# Patient Record
Sex: MALE | Race: BLACK OR AFRICAN AMERICAN | Employment: FULL TIME | URBAN - METROPOLITAN AREA
[De-identification: names, ages, dates, MRNs, and addresses within clinical notes are randomized per-mention and may not be internally consistent; named-entity substitution may affect disease eponyms.]

---

## 2018-02-15 ENCOUNTER — HOSPITAL ENCOUNTER (EMERGENCY)
Facility: HOSPITAL | Age: 58
Discharge: HOME | End: 2018-02-15
Attending: EMERGENCY MEDICINE
Payer: COMMERCIAL

## 2018-02-15 VITALS
TEMPERATURE: 100 F | HEIGHT: 74 IN | RESPIRATION RATE: 18 BRPM | HEART RATE: 99 BPM | DIASTOLIC BLOOD PRESSURE: 74 MMHG | OXYGEN SATURATION: 96 % | BODY MASS INDEX: 34.01 KG/M2 | WEIGHT: 265 LBS | SYSTOLIC BLOOD PRESSURE: 148 MMHG

## 2018-02-15 DIAGNOSIS — J02.0 STREP PHARYNGITIS: Primary | ICD-10-CM

## 2018-02-15 PROCEDURE — 99283 EMERGENCY DEPT VISIT LOW MDM: CPT

## 2018-02-15 RX ORDER — PENICILLIN V POTASSIUM 500 MG/1
500 TABLET ORAL 3 TIMES DAILY
Qty: 30 TABLET | Refills: 0 | Status: SHIPPED | OUTPATIENT
Start: 2018-02-15 | End: 2018-02-25

## 2018-02-16 NOTE — ED NOTES
Pt states has been having fevers, chills, coughing, nausea, and diarrhea.  since Tuesday and sore throat since Monday. Pt went to dialysis today, states didn't remove any fluid today. States fevers have been running approx 100.4.  Has been taking tylenol bu

## 2018-02-16 NOTE — ED INITIAL ASSESSMENT (HPI)
Patient has been sick all week with flu like symptoms. States it is getting worse. Has sore throat, and cannot talk. Just had dialysis 1 hour ago.

## 2018-02-16 NOTE — ED PROVIDER NOTES
Patient Seen in: [facility] AND [facility] Emergency Department    History   Patient presents with:  Cough/URI    Stated Complaint: sick all week. getting worse.   sore throat    HPI    Patient is a 17-year-old male who presents to the emergency department with Cardiovascular: Normal rate, regular rhythm and normal heart sounds. Pulmonary/Chest: Effort normal.   Musculoskeletal: Normal range of motion. Lymphadenopathy:     He has cervical adenopathy.    Neurological: He is alert and oriented to person, plac

## 2018-05-23 ENCOUNTER — HOSPITAL ENCOUNTER (INPATIENT)
Facility: HOSPITAL | Age: 58
LOS: 1 days | Discharge: HOME | DRG: 640 | End: 2018-05-24
Attending: EMERGENCY MEDICINE | Admitting: HOSPITALIST
Payer: COMMERCIAL

## 2018-05-23 DIAGNOSIS — N17.9 ACUTE RENAL FAILURE SUPERIMPOSED ON CHRONIC KIDNEY DISEASE, UNSPECIFIED CKD STAGE, UNSPECIFIED ACUTE RENAL FAILURE TYPE (HCC): Primary | ICD-10-CM

## 2018-05-23 DIAGNOSIS — N18.9 ACUTE RENAL FAILURE SUPERIMPOSED ON CHRONIC KIDNEY DISEASE, UNSPECIFIED CKD STAGE, UNSPECIFIED ACUTE RENAL FAILURE TYPE (HCC): Primary | ICD-10-CM

## 2018-05-23 PROBLEM — E87.5 HYPERKALEMIA: Status: ACTIVE | Noted: 2018-05-23

## 2018-05-23 PROCEDURE — 5A1D70Z PERFORMANCE OF URINARY FILTRATION, INTERMITTENT, LESS THAN 6 HOURS PER DAY: ICD-10-PCS | Performed by: INTERNAL MEDICINE

## 2018-05-23 PROCEDURE — 99223 1ST HOSP IP/OBS HIGH 75: CPT | Performed by: HOSPITALIST

## 2018-05-23 RX ORDER — FUROSEMIDE 20 MG/1
20 TABLET ORAL
Status: DISCONTINUED | OUTPATIENT
Start: 2018-05-23 | End: 2018-05-24

## 2018-05-23 RX ORDER — ERGOCALCIFEROL (VITAMIN D2) 1250 MCG
50000 CAPSULE ORAL WEEKLY
COMMUNITY

## 2018-05-23 RX ORDER — ASPIRIN 81 MG/1
81 TABLET ORAL DAILY
Status: DISCONTINUED | OUTPATIENT
Start: 2018-05-24 | End: 2018-05-24

## 2018-05-23 RX ORDER — ACETAMINOPHEN 325 MG/1
650 TABLET ORAL EVERY 6 HOURS PRN
Status: DISCONTINUED | OUTPATIENT
Start: 2018-05-23 | End: 2018-05-24

## 2018-05-23 RX ORDER — SODIUM CHLORIDE 9 MG/ML
INJECTION, SOLUTION INTRAVENOUS
Status: DISPENSED
Start: 2018-05-23 | End: 2018-05-24

## 2018-05-23 RX ORDER — CARVEDILOL 25 MG/1
25 TABLET ORAL 2 TIMES DAILY WITH MEALS
COMMUNITY

## 2018-05-23 RX ORDER — ALBUMIN (HUMAN) 12.5 G/50ML
100 SOLUTION INTRAVENOUS AS NEEDED
Status: DISCONTINUED | OUTPATIENT
Start: 2018-05-23 | End: 2018-05-24

## 2018-05-23 RX ORDER — ATORVASTATIN CALCIUM 40 MG/1
80 TABLET, FILM COATED ORAL DAILY
Status: DISCONTINUED | OUTPATIENT
Start: 2018-05-24 | End: 2018-05-24

## 2018-05-23 RX ORDER — CINACALCET 60 MG/1
60 TABLET, FILM COATED ORAL EVERY EVENING
COMMUNITY

## 2018-05-23 RX ORDER — CARVEDILOL 25 MG/1
25 TABLET ORAL 2 TIMES DAILY WITH MEALS
Status: DISCONTINUED | OUTPATIENT
Start: 2018-05-23 | End: 2018-05-24

## 2018-05-23 RX ORDER — SODIUM POLYSTYRENE SULFONATE 15 G/60ML
30 SUSPENSION ORAL; RECTAL ONCE
Status: COMPLETED | OUTPATIENT
Start: 2018-05-23 | End: 2018-05-23

## 2018-05-23 RX ORDER — MULTIVIT-MIN/FOLIC ACID/LUTEIN 400-250MCG
1 TABLET,CHEWABLE ORAL DAILY
COMMUNITY

## 2018-05-23 RX ORDER — FUROSEMIDE 20 MG/1
20 TABLET ORAL 2 TIMES DAILY
COMMUNITY

## 2018-05-23 RX ORDER — ALLOPURINOL 100 MG/1
100 TABLET ORAL DAILY
COMMUNITY

## 2018-05-23 RX ORDER — ALLOPURINOL 100 MG/1
100 TABLET ORAL DAILY
Status: DISCONTINUED | OUTPATIENT
Start: 2018-05-24 | End: 2018-05-24

## 2018-05-23 RX ORDER — LISINOPRIL 5 MG/1
5 TABLET ORAL DAILY
Status: ON HOLD | COMMUNITY
End: 2020-03-12

## 2018-05-23 RX ORDER — DEXTROSE MONOHYDRATE 25 G/50ML
50 INJECTION, SOLUTION INTRAVENOUS AS NEEDED
Status: DISCONTINUED | OUTPATIENT
Start: 2018-05-23 | End: 2018-05-24

## 2018-05-23 RX ORDER — ATORVASTATIN CALCIUM 80 MG/1
80 TABLET, FILM COATED ORAL DAILY
Status: ON HOLD | COMMUNITY
End: 2020-03-12

## 2018-05-23 RX ORDER — OMEGA-3-ACID ETHYL ESTERS 1 G/1
1 CAPSULE, LIQUID FILLED ORAL DAILY
COMMUNITY

## 2018-05-23 RX ORDER — CINACALCET 30 MG/1
60 TABLET, FILM COATED ORAL
Status: DISCONTINUED | OUTPATIENT
Start: 2018-05-24 | End: 2018-05-24

## 2018-05-23 RX ORDER — DEXTROSE MONOHYDRATE 25 G/50ML
50 INJECTION, SOLUTION INTRAVENOUS ONCE
Status: COMPLETED | OUTPATIENT
Start: 2018-05-23 | End: 2018-05-23

## 2018-05-23 RX ORDER — SODIUM CHLORIDE 0.9 % (FLUSH) 0.9 %
3 SYRINGE (ML) INJECTION AS NEEDED
Status: DISCONTINUED | OUTPATIENT
Start: 2018-05-23 | End: 2018-05-24

## 2018-05-23 RX ORDER — HEPARIN SODIUM 5000 [USP'U]/ML
5000 INJECTION, SOLUTION INTRAVENOUS; SUBCUTANEOUS EVERY 12 HOURS SCHEDULED
Status: DISCONTINUED | OUTPATIENT
Start: 2018-05-23 | End: 2018-05-23

## 2018-05-23 RX ORDER — ASPIRIN 81 MG/1
81 TABLET ORAL DAILY
COMMUNITY

## 2018-05-23 RX ORDER — ONDANSETRON 2 MG/ML
4 INJECTION INTRAMUSCULAR; INTRAVENOUS EVERY 6 HOURS PRN
Status: DISCONTINUED | OUTPATIENT
Start: 2018-05-23 | End: 2018-05-24

## 2018-05-23 NOTE — ED PROVIDER NOTES
Patient Seen in: [facility] AND [facility] Emergency Department    History   Patient presents with:  Fall (musculoskeletal, neurologic)  Weakness    Stated Complaint: \"WEAK LEGS\"    HPI    Patient is a 57-year-old man with a history of diabetes and renal failu Cardiovascular: Normal rate, regular rhythm, normal heart sounds and intact distal pulses. Pulmonary/Chest: Effort normal and breath sounds normal. No respiratory distress. Abdominal: Soft.  Bowel sounds are normal. Exhibits no distension and no mass DIFFERENTIAL[[id number]]          Abnormal            Final result                 Please view results for these tests on the individual orders.    RAINBOW DRAW BLUE   RAINBOW DRAW LAVENDER   RAINBOW DRAW DARK GREEN   RAINBOW DRAW LIGHT GREEN   RAINBOW DRAW

## 2018-05-23 NOTE — H&P
[facility]    PATIENT'S NAME: Keerthi Silvestre   ATTENDING PHYSICIAN: Elina Poe MD   PATIENT ACCOUNT#:   [id number]    LOCATION:  [place] [address]  MEDICAL RECORD #:   [id number]       YOB: 1960  ADMISSION DATE:       05/2 air.  HEENT:  Atraumatic. Oropharynx clear. Moist mucous membranes. Normal hard and soft palate. NECK:  Trachea midline. Full range of motion. Supple. No thyromegaly or lymphadenopathy. LUNGS:  Clear to auscultation bilaterally.   Normal respirator

## 2018-05-23 NOTE — ED INITIAL ASSESSMENT (HPI)
PATIENT C/O FALL OUT OF FORK LIFT AFTER \"BOTH LEGS GAVE OUT. \" DENIES LOC, DENIES INJURY. PATIENT IS ON DIALYSIS, MISSED YESTERDAY'S APPT.  LAST DIALYSIS WAS 5/19

## 2018-05-23 NOTE — ED NOTES
Pt reports having weakness to legs today and was found to have hyperkalemia. He receives dialysis 3 times a week, but his last treatment was on May 19th. Denies any complaints at present. VS stable. Monitor shows SR without ectopy.

## 2018-05-24 VITALS
OXYGEN SATURATION: 97 % | WEIGHT: 274.38 LBS | BODY MASS INDEX: 35.21 KG/M2 | TEMPERATURE: 98 F | DIASTOLIC BLOOD PRESSURE: 73 MMHG | HEART RATE: 81 BPM | RESPIRATION RATE: 20 BRPM | HEIGHT: 74 IN | SYSTOLIC BLOOD PRESSURE: 124 MMHG

## 2018-05-24 PROCEDURE — 99239 HOSP IP/OBS DSCHRG MGMT >30: CPT | Performed by: HOSPITALIST

## 2018-05-24 NOTE — PLAN OF CARE
Problem: Patient/Family Goals  Goal: Patient/Family Long Term Goal  Patient's Long Term Goal: to return home.     Interventions:  - See additional Care Plan goals for specific interventions   Outcome: Adequate for Discharge    Goal: Patient/Family Short Ter

## 2018-05-24 NOTE — PROGRESS NOTES
Patient was seen and examined. No complaints at present. Has been ambulating in the halls with no issues. Dr. Elissa Jang has cleared patient for d/c home today.  Patient to follow up at the HD clinic tomorrow and then to resume his usual TTS schedule per Dr. Bran Argueta

## 2018-05-24 NOTE — DISCHARGE SUMMARY
[facility] HOSPITALIST  DISCHARGE SUMMARY     Consuello Schlatter Patient Status:  Inpatient    1960 MRN [id number]   Location [address] [facility] Attending No att. providers found   [facility] Day # 1 PCP [address]: 2018  STEPHANIE Tabs  Commonly known as:  SENSIPAR      Take 60 mg by mouth daily with breakfast.   Refills:  0     ergocalciferol 12210 units Caps  Commonly known as:  ERGOCALCIFEROL      Take 50,000 Units by mouth once a week.    Refills:  0     Ferric Citrate 1  M

## 2018-05-24 NOTE — CONSULTS
[facility]    Report of Consultation    Trino Eng Patient Status:  Inpatient    1960 MRN [id number]   Location [facility] Attending Cheli Barreto MD   Hosp Day # 1 PCP Rubin Kelley     Date of Admission:  20 solution 100 mL 100 mL Intravenous PRN   allopurinol (ZYLOPRIM) tab 100 mg 100 mg Oral Daily   apixaban (ELIQUIS) tab 5 mg 5 mg Oral BID   aspirin EC tab 81 mg 81 mg Oral Daily   atorvastatin (LIPITOR) tab 80 mg 80 mg Oral Daily   carvedilol (COREG) tab 25 rhythm, no edema  Abd: Abdomen soft, nontender, nondistended, no organomegaly, bowel sounds present          Results:     Laboratory Data:  Recent Labs   Lab  05/23/18   1446  05/24/18   0510   RBC  3.67*  3.82*   HGB  11.9*  12.4*   HCT  35.1*  35.4*   MC

## 2018-09-10 NOTE — PAYOR COMM NOTE
--------------  DISCHARGE REVIEW    Payor: [employer] [facility]  Subscriber #:  [id number]  Authorization Number: N/A    Admit date: 5/23/18  Admit time:  1803  Discharge Date: 5/24/2018 11:10 AM     Admitting Physician: Michael Mcguire MD  Attending Chemistry showed potassium of 7.6, bicarb of 18, , creatinine 11.23. CBC shows hemoglobin of 11.9. The patient will be admitted to the hospital for further management. He received IV dextrose 50% with insulin and sodium bicarb injection.   Also, h 1 g Caps  Commonly known as:  LOVAZA      Take 1 g by mouth daily.    Refills:  0            CONSULTANTS  Consultants     Provider Role Specialty    Melanie Mcneill DO Consulting Physician  NEPHROLOGY    Diamond Devlin MD Consulting Physician  Tri 171 swelling or fluid retention. Past Medical History:   Diagnosis Date   • Diabetes [facility])    • Essential hypertension    • High cholesterol    • Renal failure        History reviewed. No pertinent surgical history.         Smoking status: Light Tobacco Smok is warm and dry. Psychiatric: Normal mood and affect. Behavior is normal. Judgment and thought content normal.   Nursing note and vitals reviewed.           ED Course     Labs Reviewed   BASIC METABOLIC PANEL (8) - Abnormal; Notable for the following: performing a physical exam, bedside monitoring of interventions, collecting and interpreting tests and discussion with consultants but not including time spent performing procedures.     [facility]     Admission disposition: 5/23/2018  4:24 PM           Case discu emergency department for fatigue. Chemistry showed potassium of 7.6, bicarb of 18, , creatinine 11.23. CBC shows hemoglobin of 11.9. The patient will be admitted to the hospital for further management.   He received IV dextrose 50% with insulin an thrill. NEUROLOGIC:  Motor and sensory intact. Cranial nerves II-XII are intact. EKG showed sinus rhythm with nonspecific ST-T changes. No peaked T waves. ASSESSMENT:    1.   End-stage renal disease, missed dialysis, with hyperkalemia.   2.   Diabe

## 2019-04-14 ENCOUNTER — HOSPITAL ENCOUNTER (EMERGENCY)
Facility: HOSPITAL | Age: 59
Discharge: HOME | End: 2019-04-14
Attending: EMERGENCY MEDICINE
Payer: COMMERCIAL

## 2019-04-14 ENCOUNTER — APPOINTMENT (OUTPATIENT)
Dept: CT IMAGING | Facility: HOSPITAL | Age: 59
End: 2019-04-14
Attending: EMERGENCY MEDICINE
Payer: COMMERCIAL

## 2019-04-14 VITALS
RESPIRATION RATE: 16 BRPM | HEART RATE: 54 BPM | DIASTOLIC BLOOD PRESSURE: 70 MMHG | BODY MASS INDEX: 36.58 KG/M2 | TEMPERATURE: 98 F | WEIGHT: 276 LBS | OXYGEN SATURATION: 94 % | SYSTOLIC BLOOD PRESSURE: 128 MMHG | HEIGHT: 73 IN

## 2019-04-14 DIAGNOSIS — M54.6 BACK PAIN OF THORACOLUMBAR REGION: Primary | ICD-10-CM

## 2019-04-14 DIAGNOSIS — N18.6 ESRD ON HEMODIALYSIS (HCC): ICD-10-CM

## 2019-04-14 DIAGNOSIS — Z99.2 ESRD ON HEMODIALYSIS (HCC): ICD-10-CM

## 2019-04-14 DIAGNOSIS — M54.50 BACK PAIN OF THORACOLUMBAR REGION: Primary | ICD-10-CM

## 2019-04-14 PROCEDURE — 80048 BASIC METABOLIC PNL TOTAL CA: CPT | Performed by: EMERGENCY MEDICINE

## 2019-04-14 PROCEDURE — 96375 TX/PRO/DX INJ NEW DRUG ADDON: CPT

## 2019-04-14 PROCEDURE — 96374 THER/PROPH/DIAG INJ IV PUSH: CPT

## 2019-04-14 PROCEDURE — 85025 COMPLETE CBC W/AUTO DIFF WBC: CPT | Performed by: EMERGENCY MEDICINE

## 2019-04-14 PROCEDURE — 81001 URINALYSIS AUTO W/SCOPE: CPT | Performed by: EMERGENCY MEDICINE

## 2019-04-14 PROCEDURE — 71260 CT THORAX DX C+: CPT | Performed by: EMERGENCY MEDICINE

## 2019-04-14 PROCEDURE — 74176 CT ABD & PELVIS W/O CONTRAST: CPT | Performed by: EMERGENCY MEDICINE

## 2019-04-14 PROCEDURE — 99285 EMERGENCY DEPT VISIT HI MDM: CPT

## 2019-04-14 RX ORDER — MORPHINE SULFATE 4 MG/ML
4 INJECTION, SOLUTION INTRAMUSCULAR; INTRAVENOUS ONCE
Status: COMPLETED | OUTPATIENT
Start: 2019-04-14 | End: 2019-04-14

## 2019-04-14 RX ORDER — ORPHENADRINE CITRATE 30 MG/ML
60 INJECTION INTRAMUSCULAR; INTRAVENOUS ONCE
Status: COMPLETED | OUTPATIENT
Start: 2019-04-14 | End: 2019-04-14

## 2019-04-14 RX ORDER — ORPHENADRINE CITRATE 100 MG/1
100 TABLET, EXTENDED RELEASE ORAL 2 TIMES DAILY PRN
Qty: 20 TABLET | Refills: 0 | Status: SHIPPED | OUTPATIENT
Start: 2019-04-14 | End: 2019-04-21

## 2019-04-14 NOTE — ED PROVIDER NOTES
Patient Seen in: [facility] AND [facility] Emergency Department    History   Patient presents with:  Abdomen/Flank Pain (GI/)    Stated Complaint: Lower back pain    HPI    64year old male with a history of diabetes, hypertension, high cholesterol, DVT/PE on Constitutional: He is oriented to person, place, and time. He appears well-developed and well-nourished. He is cooperative. Non-toxic appearance. No distress. HENT:   Head: Normocephalic and atraumatic.    Eyes: Pupils are equal, round, and reactive to l All other components within normal limits   CBC W/ DIFFERENTIAL - Abnormal; Notable for the following components:    RBC 3.30 (*)     HGB 10.5 (*)     HCT 32.4 (*)     RDW-SD 48.6 (*)     PLT 96.0 (*)     All other components within normal limits   CBC WI morphINE sulfate (PF) 4 MG/ML injection 4 mg (4 mg Intravenous Given 4/14/19 1048)   Orphenadrine Citrate (NORFLEX) injection 60 mg (60 mg Intravenous Given 4/14/19 1048)   iopamidol (ISOVUE-M) 76 % injection 100 mL (80 mL Intravenous Given 4/14/19 1239)

## 2019-04-14 NOTE — ED NOTES
Received pt a/ox3, clear speech, nad, no resp distress, ambulatory with steady gait  Here with c/o HPI triage note with R side tenderness     20 G PIV established to R AC. Flushes well, no s/s of infiltration noted. Labs sent for processing.  meds given, se

## 2019-04-14 NOTE — ED INITIAL ASSESSMENT (HPI)
Pt states \"Right flank pain for 5 days, hx of HD, days T/TH/Saturday, last HD was yesterday\" Denies N/V/fever.

## 2019-11-20 ENCOUNTER — HOSPITAL ENCOUNTER (EMERGENCY)
Facility: HOSPITAL | Age: 59
Discharge: HOME | End: 2019-11-20
Attending: EMERGENCY MEDICINE
Payer: MEDICARE

## 2019-11-20 ENCOUNTER — APPOINTMENT (OUTPATIENT)
Dept: GENERAL RADIOLOGY | Facility: HOSPITAL | Age: 59
End: 2019-11-20
Attending: EMERGENCY MEDICINE
Payer: MEDICARE

## 2019-11-20 VITALS
HEART RATE: 81 BPM | RESPIRATION RATE: 18 BRPM | TEMPERATURE: 99 F | WEIGHT: 278 LBS | SYSTOLIC BLOOD PRESSURE: 133 MMHG | DIASTOLIC BLOOD PRESSURE: 78 MMHG | OXYGEN SATURATION: 97 % | HEIGHT: 74 IN | BODY MASS INDEX: 35.68 KG/M2

## 2019-11-20 DIAGNOSIS — R05.9 COUGH: Primary | ICD-10-CM

## 2019-11-20 PROCEDURE — 99283 EMERGENCY DEPT VISIT LOW MDM: CPT

## 2019-11-20 PROCEDURE — 71045 X-RAY EXAM CHEST 1 VIEW: CPT | Performed by: EMERGENCY MEDICINE

## 2019-11-20 RX ORDER — BENZONATATE 200 MG/1
200 CAPSULE ORAL 3 TIMES DAILY PRN
Qty: 20 CAPSULE | Refills: 0 | Status: SHIPPED | OUTPATIENT
Start: 2019-11-20 | End: 2020-09-09

## 2019-11-20 RX ORDER — IPRATROPIUM BROMIDE AND ALBUTEROL SULFATE 2.5; .5 MG/3ML; MG/3ML
3 SOLUTION RESPIRATORY (INHALATION) ONCE
Status: DISCONTINUED | OUTPATIENT
Start: 2019-11-20 | End: 2019-11-20

## 2019-11-20 RX ORDER — CODEINE PHOSPHATE AND GUAIFENESIN 10; 100 MG/5ML; MG/5ML
5 SOLUTION ORAL EVERY 6 HOURS PRN
Qty: 118 ML | Refills: 0 | Status: SHIPPED | OUTPATIENT
Start: 2019-11-20 | End: 2020-09-09

## 2019-11-20 RX ORDER — ALBUTEROL SULFATE 90 UG/1
2 AEROSOL, METERED RESPIRATORY (INHALATION) EVERY 4 HOURS PRN
Qty: 1 INHALER | Refills: 0 | Status: SHIPPED | OUTPATIENT
Start: 2019-11-20 | End: 2019-12-20

## 2019-11-20 NOTE — ED PROVIDER NOTES
Patient Seen in: [facility] AND [facility] Emergency Department      History   Patient presents with:  Cough    Stated Complaint: Cough    HPI    24-year-old male with history of diabetes, hypertension, hyperlipidemia, ESRD, on dialysis, last dialysis yesterday, vital signs reviewed. All other systems reviewed and negative except as noted above.     Physical Exam     ED Triage Vitals [11/20/19 0103]   /78   Pulse 81   Resp 18   Temp 98.7 °F (37.1 °C)   Temp src Oral   SpO2 97 %   O2 Device None (Room air Chest portable AP view 11/20/19 at 1:16 AM      IMPRESSION:    The lungs appear clear. The cardiac silhouette is at the upper limits of normal in size. Results faxed/finalized on 11/20/19 at 2:35 AM ET.   If you would like to discuss this case dir MEDICAL DECISION MAKING:  After obtaining the patient's history, performing the physical exam and reviewing the diagnostics, multiple initial diagnoses were considered based on the presenting problem including bronchitis, viral syndrome, pneumonia, fluid o

## 2019-11-20 NOTE — ED INITIAL ASSESSMENT (HPI)
Cough X 11 days, pt states cough has gone from productive no nonproductive, rx amoxicillin with no relief of symptoms.

## 2020-03-11 ENCOUNTER — HOSPITAL ENCOUNTER (INPATIENT)
Facility: HOSPITAL | Age: 60
LOS: 1 days | Discharge: HOME | DRG: 682 | End: 2020-03-12
Attending: EMERGENCY MEDICINE | Admitting: HOSPITALIST
Payer: MEDICARE

## 2020-03-11 DIAGNOSIS — N18.6 ESRD ON HEMODIALYSIS (HCC): ICD-10-CM

## 2020-03-11 DIAGNOSIS — Z99.2 ESRD ON HEMODIALYSIS (HCC): ICD-10-CM

## 2020-03-11 DIAGNOSIS — E87.5 HYPERKALEMIA: Primary | ICD-10-CM

## 2020-03-11 LAB
ANION GAP SERPL CALC-SCNC: 11 MMOL/L (ref 0–18)
BASOPHILS # BLD AUTO: 0.03 X10(3) UL (ref 0–0.2)
BASOPHILS NFR BLD AUTO: 0.5 %
BUN BLD-MCNC: 90 MG/DL (ref 7–18)
BUN/CREAT SERPL: 8.3 (ref 10–20)
CALCIUM BLD-MCNC: 8.9 MG/DL (ref 8.5–10.1)
CHLORIDE SERPL-SCNC: 101 MMOL/L (ref 98–112)
CO2 SERPL-SCNC: 26 MMOL/L (ref 21–32)
CREAT BLD-MCNC: 10.9 MG/DL (ref 0.7–1.3)
DEPRECATED RDW RBC AUTO: 44.6 FL (ref 35.1–46.3)
EOSINOPHIL # BLD AUTO: 0.11 X10(3) UL (ref 0–0.7)
EOSINOPHIL NFR BLD AUTO: 1.7 %
ERYTHROCYTE [DISTWIDTH] IN BLOOD BY AUTOMATED COUNT: 12.9 % (ref 11–15)
GLUCOSE BLD-MCNC: 181 MG/DL (ref 70–99)
GLUCOSE BLDC GLUCOMTR-MCNC: 95 MG/DL (ref 70–99)
HBV SURFACE AG SER-ACNC: <0.1 [IU]/L
HBV SURFACE AG SERPL QL IA: NONREACTIVE
HCT VFR BLD AUTO: 28.4 % (ref 39–53)
HGB BLD-MCNC: 9.6 G/DL (ref 13–17.5)
IMM GRANULOCYTES # BLD AUTO: 0.03 X10(3) UL (ref 0–1)
IMM GRANULOCYTES NFR BLD: 0.5 %
LYMPHOCYTES # BLD AUTO: 1.57 X10(3) UL (ref 1–4)
LYMPHOCYTES NFR BLD AUTO: 23.7 %
MCH RBC QN AUTO: 32.1 PG (ref 26–34)
MCHC RBC AUTO-ENTMCNC: 33.8 G/DL (ref 31–37)
MCV RBC AUTO: 95 FL (ref 80–100)
MONOCYTES # BLD AUTO: 0.6 X10(3) UL (ref 0.1–1)
MONOCYTES NFR BLD AUTO: 9.1 %
NEUTROPHILS # BLD AUTO: 4.28 X10 (3) UL (ref 1.5–7.7)
NEUTROPHILS # BLD AUTO: 4.28 X10(3) UL (ref 1.5–7.7)
NEUTROPHILS NFR BLD AUTO: 64.5 %
OSMOLALITY SERPL CALC.SUM OF ELEC: 318 MOSM/KG (ref 275–295)
PLATELET # BLD AUTO: 104 10(3)UL (ref 150–450)
POTASSIUM SERPL-SCNC: 5.8 MMOL/L (ref 3.5–5.1)
RBC # BLD AUTO: 2.99 X10(6)UL (ref 4.3–5.7)
SODIUM SERPL-SCNC: 138 MMOL/L (ref 136–145)
WBC # BLD AUTO: 6.6 X10(3) UL (ref 4–11)

## 2020-03-11 PROCEDURE — 80048 BASIC METABOLIC PNL TOTAL CA: CPT | Performed by: EMERGENCY MEDICINE

## 2020-03-11 PROCEDURE — 99285 EMERGENCY DEPT VISIT HI MDM: CPT

## 2020-03-11 PROCEDURE — 93005 ELECTROCARDIOGRAM TRACING: CPT

## 2020-03-11 PROCEDURE — 85025 COMPLETE CBC W/AUTO DIFF WBC: CPT | Performed by: EMERGENCY MEDICINE

## 2020-03-11 PROCEDURE — 82962 GLUCOSE BLOOD TEST: CPT

## 2020-03-11 PROCEDURE — 93010 ELECTROCARDIOGRAM REPORT: CPT | Performed by: EMERGENCY MEDICINE

## 2020-03-11 PROCEDURE — 87340 HEPATITIS B SURFACE AG IA: CPT | Performed by: INTERNAL MEDICINE

## 2020-03-11 PROCEDURE — 36415 COLL VENOUS BLD VENIPUNCTURE: CPT

## 2020-03-11 RX ORDER — ROSUVASTATIN CALCIUM 10 MG/1
40 TABLET, COATED ORAL NIGHTLY
Status: DISCONTINUED | OUTPATIENT
Start: 2020-03-11 | End: 2020-03-12

## 2020-03-11 RX ORDER — ALLOPURINOL 100 MG/1
100 TABLET ORAL DAILY
Status: DISCONTINUED | OUTPATIENT
Start: 2020-03-12 | End: 2020-03-12

## 2020-03-11 RX ORDER — ALBUMIN (HUMAN) 12.5 G/50ML
100 SOLUTION INTRAVENOUS AS NEEDED
Status: DISCONTINUED | OUTPATIENT
Start: 2020-03-11 | End: 2020-03-12

## 2020-03-11 RX ORDER — LOSARTAN POTASSIUM 25 MG/1
50 TABLET ORAL DAILY
COMMUNITY

## 2020-03-11 RX ORDER — AMLODIPINE BESYLATE 5 MG/1
7.5 TABLET ORAL DAILY
COMMUNITY

## 2020-03-11 RX ORDER — ASPIRIN 81 MG/1
81 TABLET ORAL DAILY
Status: DISCONTINUED | OUTPATIENT
Start: 2020-03-12 | End: 2020-03-12

## 2020-03-11 RX ORDER — ROSUVASTATIN CALCIUM 40 MG/1
40 TABLET, COATED ORAL NIGHTLY
COMMUNITY

## 2020-03-11 RX ORDER — CINACALCET 30 MG/1
60 TABLET, FILM COATED ORAL
Status: DISCONTINUED | OUTPATIENT
Start: 2020-03-12 | End: 2020-03-12

## 2020-03-11 RX ORDER — CARVEDILOL 25 MG/1
25 TABLET ORAL 2 TIMES DAILY WITH MEALS
Status: DISCONTINUED | OUTPATIENT
Start: 2020-03-12 | End: 2020-03-12

## 2020-03-11 NOTE — ED PROVIDER NOTES
Patient Seen in: [facility] AND [facility] Emergency Department      History   Patient presents with:  Abnormal Result    Stated Complaint: hyperkalemia    HPI    65-year-old male presents for complaint of a potassium of 6.7.   Patient states he had blood work do is well-developed. HENT:      Head: Normocephalic and atraumatic. Eyes:      General: Lids are normal.      Extraocular Movements: Extraocular movements intact. Neck:      Musculoskeletal: Normal range of motion and neck supple.    Cardiovascular: CBC W/ DIFFERENTIAL[[id number]]          Abnormal            Final result                 Please view results for these tests on the individual orders.    RAINBOW DRAW BLUE   RAINBOW DRAW LAVENDER   RAINBOW DRAW LIGHT GREEN   RAINBOW DRAW GOLD

## 2020-03-12 VITALS
HEART RATE: 81 BPM | RESPIRATION RATE: 18 BRPM | SYSTOLIC BLOOD PRESSURE: 146 MMHG | DIASTOLIC BLOOD PRESSURE: 74 MMHG | TEMPERATURE: 98 F | OXYGEN SATURATION: 94 % | WEIGHT: 280.31 LBS | HEIGHT: 74 IN | BODY MASS INDEX: 35.97 KG/M2

## 2020-03-12 LAB
ANION GAP SERPL CALC-SCNC: 10 MMOL/L (ref 0–18)
BUN BLD-MCNC: 39 MG/DL (ref 7–18)
BUN/CREAT SERPL: 7.3 (ref 10–20)
CALCIUM BLD-MCNC: 9.8 MG/DL (ref 8.5–10.1)
CHLORIDE SERPL-SCNC: 102 MMOL/L (ref 98–112)
CO2 SERPL-SCNC: 25 MMOL/L (ref 21–32)
CREAT BLD-MCNC: 5.33 MG/DL (ref 0.7–1.3)
GLUCOSE BLD-MCNC: 92 MG/DL (ref 70–99)
GLUCOSE BLDC GLUCOMTR-MCNC: 103 MG/DL (ref 70–99)
OSMOLALITY SERPL CALC.SUM OF ELEC: 293 MOSM/KG (ref 275–295)
POTASSIUM SERPL-SCNC: 3.9 MMOL/L (ref 3.5–5.1)
SODIUM SERPL-SCNC: 137 MMOL/L (ref 136–145)

## 2020-03-12 PROCEDURE — 5A1D70Z PERFORMANCE OF URINARY FILTRATION, INTERMITTENT, LESS THAN 6 HOURS PER DAY: ICD-10-PCS | Performed by: INTERNAL MEDICINE

## 2020-03-12 PROCEDURE — 82962 GLUCOSE BLOOD TEST: CPT

## 2020-03-12 PROCEDURE — 80048 BASIC METABOLIC PNL TOTAL CA: CPT | Performed by: HOSPITALIST

## 2020-03-12 PROCEDURE — 90935 HEMODIALYSIS ONE EVALUATION: CPT

## 2020-03-12 RX ORDER — DEXTROSE MONOHYDRATE 25 G/50ML
50 INJECTION, SOLUTION INTRAVENOUS
Status: DISCONTINUED | OUTPATIENT
Start: 2020-03-12 | End: 2020-03-12

## 2020-03-12 NOTE — H&P
[facility] Hospitalist H&P     CC: Patient presents with:  Abnormal Result       PCP: Nathalia Lopez    Admission Date: 3/11/2020    ASSESSMENT / PLAN:   Mr. Silvina Hills is a 64year old male with PMH of ESRD on home HD 4 days a week , T2DM, HTN, HLD, gout who p failure         PSH  History reviewed. No pertinent surgical history.      ALL:  No Known Allergies     Home Medications:  Rosuvastatin Calcium 40 MG Oral Tab, Take 40 mg by mouth nightly., Disp: , Rfl:   amLODIPine Besylate 5 MG Oral Tab, Take 7.5 mg by mo intact  Psych: Affect- normal  SKIN: warm, dry  EXT: no edema    Diagnostic Data:    CBC/Chem    Recent Labs   Lab 03/11/20  1751   RBC 2.99*   HGB 9.6*   HCT 28.4*   MCV 95.0   MCH 32.1   MCHC 33.8   RDW 12.9   NEPRELIM 4.28   WBC 6.6   .0*

## 2020-03-12 NOTE — ED NOTES
Orders for admission, patient is aware of plan and ready to go upstairs.  Any questions, please call ED RN Nancy Berg  at extension [phone]

## 2020-03-12 NOTE — PLAN OF CARE
Problem: Patient Centered Care  Goal: Patient preferences are identified and integrated in the patient's plan of care  Description  Interventions:  - What would you like us to know as we care for you?  I do dialysis at home  - Provide timely, complete, an Arrange for needed discharge resources and transportation as appropriate  - Identify discharge learning needs (meds, wound care, etc)  - Arrange for interpreters to assist at discharge as needed  - Consider post-discharge preferences of patient/family/disc medications to maintain glucose within target range  - Assess barriers to adequate nutritional intake and initiate nutrition consult as needed  - Instruct patient on self management of diabetes  Outcome: Progressing  Goal: Electrolytes maintained within no improved after dialysis last night. On tele prior to d/c. Pt d/c today with belongings at this time.

## 2020-03-12 NOTE — DISCHARGE SUMMARY
[facility] Hospitalist Discharge Summary   Patient ID:  Arabella Hall [id number]  89 year old 1/8/1960    Admit date: 3/11/2020  Discharge date: 3/12/2020  Risk of Readmission Lace+ Score: 31  59-90 High Risk  29-58 Medium Risk  0-28   Low Risk    Primary Car AC  Take 5 mL by mouth every 6 (six) hours as needed for cough.      Losartan Potassium 25 MG Tabs  Commonly known as:  COZAAR     multiple vitamin Chew     Omega-3-acid Ethyl Esters 1 g Caps  Commonly known as:  LOVAZA     Rosuvastatin Calcium 40 MG Tabs

## 2020-03-12 NOTE — PLAN OF CARE
Problem: Patient Centered Care  Goal: Patient preferences are identified and integrated in the patient's plan of care  Description  Interventions:  - What would you like us to know as we care for you?  \"I do dialysis at home\"  - Provide timely, complete Arrange for needed discharge resources and transportation as appropriate  - Identify discharge learning needs (meds, wound care, etc)  - Arrange for interpreters to assist at discharge as needed  - Consider post-discharge preferences of patient/family/disc medications to maintain glucose within target range  - Assess barriers to adequate nutritional intake and initiate nutrition consult as needed  - Instruct patient on self management of diabetes  Outcome: Progressing  Goal: Electrolytes maintained within no Will continue to monitor closely.

## 2020-03-12 NOTE — CONSULTS
[facility] HOSP - [facility]    Report of Consultation    Trino Eng Patient Status:  Inpatient    1960 MRN [id number]   Location [facility] 5SW/SE Attending Jonelle Lenz MD   Hosp Day # 1 PCP Rubin Kelley     Date of Admission:  3/11 PRN  Insulin Aspart Pen (NOVOLOG) 100 UNIT/ML flexpen 1-5 Units, 1-5 Units, Subcutaneous, TID CC  Albumin Human (ALBUMINAR) 25 % solution 100 mL, 100 mL, Intravenous, PRN  Rosuvastatin Calcium (CRESTOR) tab 40 mg, 40 mg, Oral, Nightly  allopurinol (ZYLOPRI completed. Pertinent positives as above and all the rest were negative.      Physical Exam:   /74 (BP Location: Right arm)   Pulse 81   Temp 98.3 °F (36.8 °C) (Oral)   Resp 18   Ht 6' 2\" (1.88 m)   Wt 280 lb 4.8 oz (127.1 kg)   SpO2 94%   BMI 35.99

## 2020-03-13 NOTE — PAYOR COMM NOTE
--------------  ADMISSION REVIEW     Payor: [employer]  Subscriber #:  [id number]  Authorization Number: [id number]    Admit date: 3/11/20  Admit time: 2044       Admitting Physician: Prateek Bustillos MD  Attending Physician:  No att. providers Positive for stated complaint: hyperkalemia  Other systems are as noted in HPI. Constitutional and vital signs reviewed. All other systems reviewed and negative except as noted above.     Physical Exam     ED Triage Vitals [03/11/20 1715]    Calculated Osmolality 318 (*)     GFR, Non- 5 (*)     GFR, -American 5 (*)     All other components within normal limits   CBC W/ DIFFERENTIAL - Abnormal; Notable for the following components:    RBC 2.99 (*)     HGB 9.6 (*)     HCT We recommend that you schedule follow up care with a primary care provider within the next three months to obtain basic health screening including reassessment of your blood pressure.     Medications Prescribed:  Current Discharge Medication List Patient and/or patient's family given opportunity to ask questions and note understanding and agreeing with therapeutic plan as outlined    Ana Oakley MD  [facility] Hospitalist  Answering Service number: [phone]    HPI       History of Present Illness:   M multiple vitamin Oral Chew Tab, Chew 1 tablet by mouth daily. , Disp: , Rfl:           Soc Hx  Social History    Tobacco Use            Smokeless tobacco: Never Used    Alcohol use: Not Currently       Fam Hx  History reviewed. No pertinent family history. ED Consult to Nephrology [[id number]] ordered by Lewis Parks MD at 03/11/20 8994          Signed        [facility]     Report of Consultation           Pascual Sandoval Patient Status:  Inpatient glucose (DEX4) oral liquid 30 g, 30 g, Oral, Q15 Min PRN    Or  Glucose-Vitamin C (DEX-4) chewable tab 8 tablet, 8 tablet, Oral, Q15 Min PRN  Insulin Aspart Pen (NOVOLOG) 100 UNIT/ML flexpen 1-5 Units, 1-5 Units, Subcutaneous, TID CC  Albumin Human (ALBUMI ergocalciferol 57934 units Oral Cap, Take 50,000 Units by mouth once a week. Takes with dialysis             Allergies  No Known Allergies     Review of Systems:      General: weak           A comprehensive 12 point review of systems was completed.   Pertin Chart Review: Note Routing History     No routing history on file.      PLEASE FAX DAYS CERTIFIED AND NEXT REVIEW DATE

## 2020-03-17 NOTE — PAYOR COMM NOTE
--------------  DISCHARGE REVIEW    Payor: [employer]  Subscriber #:  [id number]  Authorization Number: [id number]    Admit date: 3/11/20  Admit time:  2044  Discharge Date: 3/12/2020  1:20 PM     Admitting Physician: Courtney Loza MD  [facility]

## 2020-03-24 NOTE — PAYOR COMM NOTE
--------------  CONTINUED STAY REVIEW    Payor: [facility]  Subscriber #:  [id number]  Authorization Number: [id number]    Admit date: 3/11/20  Admit time: 2044    - ALL CLINICALS HAVE BEEN FAXED - PLEASE FAX APPROVAL FOR DAYS. THANK YOU.

## 2020-09-09 ENCOUNTER — APPOINTMENT (OUTPATIENT)
Dept: PICC SERVICES | Facility: HOSPITAL | Age: 60
DRG: 291 | End: 2020-09-09
Attending: INTERNAL MEDICINE
Payer: MEDICARE

## 2020-09-09 ENCOUNTER — APPOINTMENT (OUTPATIENT)
Dept: GENERAL RADIOLOGY | Facility: HOSPITAL | Age: 60
DRG: 291 | End: 2020-09-09
Attending: HOSPITALIST
Payer: MEDICARE

## 2020-09-09 ENCOUNTER — APPOINTMENT (OUTPATIENT)
Dept: GENERAL RADIOLOGY | Facility: HOSPITAL | Age: 60
DRG: 291 | End: 2020-09-09
Attending: NURSE PRACTITIONER
Payer: MEDICARE

## 2020-09-09 ENCOUNTER — APPOINTMENT (OUTPATIENT)
Dept: GENERAL RADIOLOGY | Facility: HOSPITAL | Age: 60
DRG: 291 | End: 2020-09-09
Attending: EMERGENCY MEDICINE
Payer: MEDICARE

## 2020-09-09 ENCOUNTER — ANESTHESIA (OUTPATIENT)
Dept: CARDIOLOGY UNIT | Facility: HOSPITAL | Age: 60
DRG: 291 | End: 2020-09-09
Payer: MEDICARE

## 2020-09-09 ENCOUNTER — ANESTHESIA EVENT (OUTPATIENT)
Dept: CARDIOLOGY UNIT | Facility: HOSPITAL | Age: 60
DRG: 291 | End: 2020-09-09
Payer: MEDICARE

## 2020-09-09 ENCOUNTER — HOSPITAL ENCOUNTER (INPATIENT)
Facility: HOSPITAL | Age: 60
LOS: 4 days | Discharge: HOME | DRG: 291 | End: 2020-09-13
Attending: EMERGENCY MEDICINE | Admitting: INTERNAL MEDICINE
Payer: MEDICARE

## 2020-09-09 DIAGNOSIS — R77.8 ELEVATED TROPONIN: ICD-10-CM

## 2020-09-09 DIAGNOSIS — J81.0 ACUTE PULMONARY EDEMA (HCC): Primary | ICD-10-CM

## 2020-09-09 DIAGNOSIS — N18.9 CHRONIC KIDNEY DISEASE, UNSPECIFIED CKD STAGE: ICD-10-CM

## 2020-09-09 LAB
ALBUMIN SERPL-MCNC: 3 G/DL (ref 3.4–5)
ALP LIVER SERPL-CCNC: 54 U/L (ref 45–117)
ALT SERPL-CCNC: 25 U/L (ref 16–61)
ANION GAP SERPL CALC-SCNC: 13 MMOL/L (ref 0–18)
ANION GAP SERPL CALC-SCNC: 6 MMOL/L (ref 0–18)
APTT PPP: 157.7 SECONDS (ref 23.2–35.3)
APTT PPP: 27.7 SECONDS (ref 23.2–35.3)
AST SERPL-CCNC: 29 U/L (ref 15–37)
BASE EXCESS BLD CALC-SCNC: 2.7 MMOL/L (ref ?–2)
BASOPHILS # BLD AUTO: 0.04 X10(3) UL (ref 0–0.2)
BASOPHILS NFR BLD AUTO: 0.5 %
BILIRUB DIRECT SERPL-MCNC: 0.2 MG/DL (ref 0–0.2)
BILIRUB SERPL-MCNC: 0.4 MG/DL (ref 0.1–2)
BUN BLD-MCNC: 45 MG/DL (ref 7–18)
BUN BLD-MCNC: 46 MG/DL (ref 7–18)
BUN/CREAT SERPL: 5.7 (ref 10–20)
BUN/CREAT SERPL: 6.1 (ref 10–20)
CA-I BLD-SCNC: 1.26 MMOL/L (ref 0.95–1.32)
CALCIUM BLD-MCNC: 9.7 MG/DL (ref 8.5–10.1)
CALCIUM BLD-MCNC: 9.7 MG/DL (ref 8.5–10.1)
CHLORIDE SERPL-SCNC: 97 MMOL/L (ref 98–112)
CHLORIDE SERPL-SCNC: 98 MMOL/L (ref 98–112)
CO2 SERPL-SCNC: 25 MMOL/L (ref 21–32)
CO2 SERPL-SCNC: 30 MMOL/L (ref 21–32)
COHGB MFR BLD: 3.8 % (ref 0–1.5)
CREAT BLD-MCNC: 7.52 MG/DL (ref 0.7–1.3)
CREAT BLD-MCNC: 7.83 MG/DL (ref 0.7–1.3)
DEPRECATED RDW RBC AUTO: 46.5 FL (ref 35.1–46.3)
DEPRECATED RDW RBC AUTO: 47.5 FL (ref 35.1–46.3)
EOSINOPHIL # BLD AUTO: 0.1 X10(3) UL (ref 0–0.7)
EOSINOPHIL NFR BLD AUTO: 1.2 %
ERYTHROCYTE [DISTWIDTH] IN BLOOD BY AUTOMATED COUNT: 13.8 % (ref 11–15)
ERYTHROCYTE [DISTWIDTH] IN BLOOD BY AUTOMATED COUNT: 14.1 % (ref 11–15)
EST. AVERAGE GLUCOSE BLD GHB EST-MCNC: 114 MG/DL (ref 68–126)
GLUCOSE BLD-MCNC: 115 MG/DL (ref 70–99)
GLUCOSE BLD-MCNC: 220 MG/DL (ref 70–99)
GLUCOSE BLDC GLUCOMTR-MCNC: 110 MG/DL (ref 70–99)
GLUCOSE BLDC GLUCOMTR-MCNC: 186 MG/DL (ref 70–99)
GLUCOSE BLDC GLUCOMTR-MCNC: 84 MG/DL (ref 70–99)
HBA1C MFR BLD HPLC: 5.6 % (ref ?–5.7)
HBV SURFACE AB SER QL: REACTIVE
HBV SURFACE AB SERPL IA-ACNC: 19.43 MIU/ML
HBV SURFACE AG SER-ACNC: <0.1 [IU]/L
HBV SURFACE AG SERPL QL IA: NONREACTIVE
HCO3 BLDA-SCNC: 26.9 MEQ/L (ref 21–27)
HCT VFR BLD AUTO: 29.3 % (ref 39–53)
HCT VFR BLD AUTO: 32.2 % (ref 39–53)
HGB BLD-MCNC: 10.1 G/DL (ref 13–17.5)
HGB BLD-MCNC: 10.8 G/DL (ref 13–17.5)
HGB BLD-MCNC: 11.4 G/DL (ref 13.5–17.5)
IMM GRANULOCYTES # BLD AUTO: 0.02 X10(3) UL (ref 0–1)
IMM GRANULOCYTES NFR BLD: 0.2 %
LACTIC ACID (BLOOD GAS): 2.1 MMOL/L (ref 0.5–2.2)
LYMPHOCYTES # BLD AUTO: 1.13 X10(3) UL (ref 1–4)
LYMPHOCYTES NFR BLD AUTO: 13.4 %
M PROTEIN MFR SERPL ELPH: 7.8 G/DL (ref 6.4–8.2)
MCH RBC QN AUTO: 31.3 PG (ref 26–34)
MCH RBC QN AUTO: 31.5 PG (ref 26–34)
MCHC RBC AUTO-ENTMCNC: 33.5 G/DL (ref 31–37)
MCHC RBC AUTO-ENTMCNC: 34.5 G/DL (ref 31–37)
MCV RBC AUTO: 91.3 FL (ref 80–100)
MCV RBC AUTO: 93.3 FL (ref 80–100)
METHGB MFR BLD: 0.8 % SAT (ref 0.4–1.5)
MODIFIED ALLEN TEST: POSITIVE
MONOCYTES # BLD AUTO: 0.92 X10(3) UL (ref 0.1–1)
MONOCYTES NFR BLD AUTO: 10.9 %
NEUTROPHILS # BLD AUTO: 6.2 X10 (3) UL (ref 1.5–7.7)
NEUTROPHILS # BLD AUTO: 6.2 X10(3) UL (ref 1.5–7.7)
NEUTROPHILS NFR BLD AUTO: 73.8 %
NT-PROBNP SERPL-MCNC: ABNORMAL PG/ML (ref ?–125)
O2 CT BLD-SCNC: 14.4 VOL% (ref 15–23)
OSMOLALITY SERPL CALC.SUM OF ELEC: 288 MOSM/KG (ref 275–295)
OSMOLALITY SERPL CALC.SUM OF ELEC: 301 MOSM/KG (ref 275–295)
OXYGEN LITERS/MINUTE: 10 L/MIN
PCO2 BLDA: 60 MM HG (ref 35–45)
PH BLDA: 7.31 [PH] (ref 7.35–7.45)
PLATELET # BLD AUTO: 130 10(3)UL (ref 150–450)
PLATELET # BLD AUTO: 161 10(3)UL (ref 150–450)
PO2 BLDA: 71 MM HG (ref 80–100)
POTASSIUM (BLOOD GAS): 4.6 MMOL/L (ref 3.3–5.1)
POTASSIUM SERPL-SCNC: 4.8 MMOL/L (ref 3.5–5.1)
POTASSIUM SERPL-SCNC: 5 MMOL/L (ref 3.5–5.1)
PUNCTURE CHARGE: YES
RBC # BLD AUTO: 3.21 X10(6)UL (ref 4.3–5.7)
RBC # BLD AUTO: 3.45 X10(6)UL (ref 4.3–5.7)
SAO2 % BLDA: 93.6 % (ref 94–100)
SODIUM (BLOOD GAS): 134 MMOL/L (ref 135–145)
SODIUM SERPL-SCNC: 133 MMOL/L (ref 136–145)
SODIUM SERPL-SCNC: 136 MMOL/L (ref 136–145)
TROPONIN I SERPL-MCNC: 0.04 NG/ML (ref ?–0.04)
TROPONIN I SERPL-MCNC: <0.045 NG/ML (ref ?–0.04)
WBC # BLD AUTO: 11.2 X10(3) UL (ref 4–11)
WBC # BLD AUTO: 8.4 X10(3) UL (ref 4–11)

## 2020-09-09 PROCEDURE — 5A1D70Z PERFORMANCE OF URINARY FILTRATION, INTERMITTENT, LESS THAN 6 HOURS PER DAY: ICD-10-PCS | Performed by: INTERNAL MEDICINE

## 2020-09-09 PROCEDURE — 71045 X-RAY EXAM CHEST 1 VIEW: CPT | Performed by: NURSE PRACTITIONER

## 2020-09-09 PROCEDURE — 0BH17EZ INSERTION OF ENDOTRACHEAL AIRWAY INTO TRACHEA, VIA NATURAL OR ARTIFICIAL OPENING: ICD-10-PCS | Performed by: ANESTHESIOLOGY

## 2020-09-09 PROCEDURE — 99291 CRITICAL CARE FIRST HOUR: CPT | Performed by: HOSPITALIST

## 2020-09-09 PROCEDURE — 02HV33Z INSERTION OF INFUSION DEVICE INTO SUPERIOR VENA CAVA, PERCUTANEOUS APPROACH: ICD-10-PCS | Performed by: INTERNAL MEDICINE

## 2020-09-09 PROCEDURE — 71045 X-RAY EXAM CHEST 1 VIEW: CPT | Performed by: EMERGENCY MEDICINE

## 2020-09-09 PROCEDURE — 5A1945Z RESPIRATORY VENTILATION, 24-96 CONSECUTIVE HOURS: ICD-10-PCS | Performed by: INTERNAL MEDICINE

## 2020-09-09 PROCEDURE — 99222 1ST HOSP IP/OBS MODERATE 55: CPT | Performed by: INTERNAL MEDICINE

## 2020-09-09 PROCEDURE — 71045 X-RAY EXAM CHEST 1 VIEW: CPT | Performed by: HOSPITALIST

## 2020-09-09 PROCEDURE — 99291 CRITICAL CARE FIRST HOUR: CPT | Performed by: INTERNAL MEDICINE

## 2020-09-09 RX ORDER — HEPARIN SODIUM AND DEXTROSE 10000; 5 [USP'U]/100ML; G/100ML
18 INJECTION INTRAVENOUS ONCE
Status: COMPLETED | OUTPATIENT
Start: 2020-09-09 | End: 2020-09-09

## 2020-09-09 RX ORDER — FUROSEMIDE 20 MG/1
20 TABLET ORAL
Status: DISCONTINUED | OUTPATIENT
Start: 2020-09-09 | End: 2020-09-13

## 2020-09-09 RX ORDER — HYDRALAZINE HYDROCHLORIDE 20 MG/ML
10 INJECTION INTRAMUSCULAR; INTRAVENOUS ONCE
Status: COMPLETED | OUTPATIENT
Start: 2020-09-09 | End: 2020-09-09

## 2020-09-09 RX ORDER — HEPARIN SODIUM AND DEXTROSE 10000; 5 [USP'U]/100ML; G/100ML
INJECTION INTRAVENOUS CONTINUOUS
Status: DISCONTINUED | OUTPATIENT
Start: 2020-09-09 | End: 2020-09-11

## 2020-09-09 RX ORDER — HEPARIN SODIUM 1000 [USP'U]/ML
80 INJECTION, SOLUTION INTRAVENOUS; SUBCUTANEOUS ONCE
Status: COMPLETED | OUTPATIENT
Start: 2020-09-09 | End: 2020-09-09

## 2020-09-09 RX ORDER — ALBUTEROL SULFATE 90 UG/1
2 AEROSOL, METERED RESPIRATORY (INHALATION)
Status: DISCONTINUED | OUTPATIENT
Start: 2020-09-09 | End: 2020-09-11

## 2020-09-09 RX ORDER — NITROGLYCERIN 20 MG/100ML
INJECTION INTRAVENOUS CONTINUOUS
Status: DISCONTINUED | OUTPATIENT
Start: 2020-09-09 | End: 2020-09-11 | Stop reason: ALTCHOICE

## 2020-09-09 RX ORDER — LOSARTAN POTASSIUM 50 MG/1
50 TABLET ORAL DAILY
Status: DISCONTINUED | OUTPATIENT
Start: 2020-09-09 | End: 2020-09-13

## 2020-09-09 RX ORDER — ROSUVASTATIN CALCIUM 20 MG/1
40 TABLET, COATED ORAL NIGHTLY
Status: DISCONTINUED | OUTPATIENT
Start: 2020-09-09 | End: 2020-09-13

## 2020-09-09 RX ORDER — FUROSEMIDE 10 MG/ML
40 INJECTION INTRAMUSCULAR; INTRAVENOUS ONCE
Status: COMPLETED | OUTPATIENT
Start: 2020-09-09 | End: 2020-09-09

## 2020-09-09 RX ORDER — AMLODIPINE BESYLATE 5 MG/1
7.5 TABLET ORAL DAILY
Status: DISCONTINUED | OUTPATIENT
Start: 2020-09-09 | End: 2020-09-13

## 2020-09-09 RX ORDER — ALLOPURINOL 100 MG/1
100 TABLET ORAL DAILY
Status: DISCONTINUED | OUTPATIENT
Start: 2020-09-09 | End: 2020-09-13

## 2020-09-09 RX ORDER — MORPHINE SULFATE 2 MG/ML
2 INJECTION, SOLUTION INTRAMUSCULAR; INTRAVENOUS EVERY 2 HOUR PRN
Status: DISCONTINUED | OUTPATIENT
Start: 2020-09-09 | End: 2020-09-13

## 2020-09-09 RX ORDER — ALBUMIN (HUMAN) 12.5 G/50ML
100 SOLUTION INTRAVENOUS AS NEEDED
Status: DISCONTINUED | OUTPATIENT
Start: 2020-09-09 | End: 2020-09-10

## 2020-09-09 RX ORDER — HYDRALAZINE HYDROCHLORIDE 20 MG/ML
INJECTION INTRAMUSCULAR; INTRAVENOUS
Status: COMPLETED
Start: 2020-09-09 | End: 2020-09-09

## 2020-09-09 RX ORDER — LORAZEPAM 2 MG/ML
INJECTION INTRAMUSCULAR
Status: COMPLETED
Start: 2020-09-09 | End: 2020-09-09

## 2020-09-09 RX ORDER — NITROGLYCERIN 0.4 MG/1
TABLET SUBLINGUAL
Status: DISPENSED
Start: 2020-09-09 | End: 2020-09-10

## 2020-09-09 RX ORDER — HEPARIN SODIUM 10000 [USP'U]/100ML
INJECTION, SOLUTION INTRAVENOUS
Status: DISPENSED
Start: 2020-09-09 | End: 2020-09-10

## 2020-09-09 RX ORDER — NITROGLYCERIN 20 MG/100ML
INJECTION INTRAVENOUS
Status: COMPLETED
Start: 2020-09-09 | End: 2020-09-09

## 2020-09-09 RX ORDER — HEPARIN SODIUM 1000 [USP'U]/ML
INJECTION, SOLUTION INTRAVENOUS; SUBCUTANEOUS
Status: DISPENSED
Start: 2020-09-09 | End: 2020-09-10

## 2020-09-09 RX ORDER — CINACALCET 30 MG/1
60 TABLET, FILM COATED ORAL EVERY EVENING
Status: DISCONTINUED | OUTPATIENT
Start: 2020-09-09 | End: 2020-09-10

## 2020-09-09 RX ORDER — HYDRALAZINE HYDROCHLORIDE 20 MG/ML
20 INJECTION INTRAMUSCULAR; INTRAVENOUS EVERY 4 HOURS PRN
Status: DISCONTINUED | OUTPATIENT
Start: 2020-09-09 | End: 2020-09-13

## 2020-09-09 RX ORDER — ROCURONIUM BROMIDE 10 MG/ML
INJECTION, SOLUTION INTRAVENOUS AS NEEDED
Status: DISCONTINUED | OUTPATIENT
Start: 2020-09-09 | End: 2020-09-12

## 2020-09-09 RX ORDER — ASPIRIN 81 MG/1
81 TABLET ORAL DAILY
Status: DISCONTINUED | OUTPATIENT
Start: 2020-09-09 | End: 2020-09-13

## 2020-09-09 RX ORDER — CARVEDILOL 25 MG/1
25 TABLET ORAL 2 TIMES DAILY WITH MEALS
Status: DISCONTINUED | OUTPATIENT
Start: 2020-09-09 | End: 2020-09-13

## 2020-09-09 RX ADMIN — ROCURONIUM BROMIDE 40 MG: 10 INJECTION, SOLUTION INTRAVENOUS at 13:23:00

## 2020-09-09 NOTE — CONSULTS
Pulmonary/Critical Care Consultation Note    HPI:   Ren Crisostomo is a 64year old male with Patient presents with:  Dyspnea JOSELUIS SOB  Abdominal Pain    Galen Purvis    Pt is a 65 yo with ESRD, CHF, CAD, HK, HTN, YVES and other med problems who was ad 83745jxkbc/250mL infusion CONTINUOUS, 200-3,000 Units/hr, Intravenous, Continuous  [COMPLETED] hydrALAzine HCl (APRESOLINE) injection 10 mg, 10 mg, Intravenous, Once  allopurinol (ZYLOPRIM) tab 100 mg, 100 mg, Oral, Daily  Cinacalcet HCl (SENSIPAR) tab 60 09/09/2020    CL 97 09/09/2020    CO2 30.0 09/09/2020     09/09/2020    CA 9.7 09/09/2020    ALB 3.0 09/09/2020    ALKPHO 54 09/09/2020    BILT 0.4 09/09/2020    TP 7.8 09/09/2020    AST 29 09/09/2020    ALT 25 09/09/2020    PTT 27.7 09/09/2020    T

## 2020-09-09 NOTE — PROGRESS NOTES
Vascular Access Note  Inserted by Hiwot Lee RN  Vascular Access Screening:   Allergies to Lidocaine: no  Allergies to Latex: no  Presence of Pacemaker/Defibrillator: No  Mastectomy with Lymph Node Dissection: No  AV Fistula / AV Graft: Left Side  Dialysis Cath

## 2020-09-09 NOTE — ED NOTES
Orders for admission, patient is aware of plan and ready to go upstairs.  Any questions, please call ED RN Andrew Montes  at extension [phone]  Type of COVID test sent:  COVID Suspicion level: Low     Titratable drug(s) infusing: NONE, SALINE LOCKED  Rate: NON

## 2020-09-09 NOTE — PROGRESS NOTES
Unable to insert PICC at this point, Hemodialysis still going on, RN notified and will reschedule tomorrow morning. Patient with G18 PIV.

## 2020-09-09 NOTE — RESPIRATORY THERAPY NOTE
Patient intubated in [place] [address] by Dr. Chinyere Landrum. Co2 detected. B/L BS auscultated. 13:45 Patient received intubated from the floor and placed on ventilator on the following settings: AC/VC 12, 600, .90%, +5. O2 saturation 92%.  Size 8.0 ETT secured at 26 at

## 2020-09-09 NOTE — ED INITIAL ASSESSMENT (HPI)
Neno Donahue reports shortness of breath that started in the middle of the night, Worse with exertion.  He does report he has epigastric pains x 3 days    Patient does home dialysis tues, thurs , sat and has been compliant with his lasix    Denies swelling in le

## 2020-09-09 NOTE — PROGRESS NOTES
Responded to RRT. Patient experiencing shortness of breath. Intubated. No additional follow up at this time.        09/09/20 2318   Clinical Encounter Type   Visited With Patient   Routine Visit Introduction   Continue Visiting No

## 2020-09-09 NOTE — CONSULTS
[facility]    Report of Consultation    Armando Gregg Patient Status:  Inpatient    1960 MRN [id number]   Location [facility] 3W/SW Attending [address], [address] [facility] Day # 0 YAMILETH Chaidez     Date of Admissi Citrate 1  MG(Fe) Oral Tab, Take 3 tablets by mouth 3 (three) times daily with meals. ergocalciferol 92235 units Oral Cap, Take 50,000 Units by mouth once a week.  Takes with dialysis   Omega-3-acid Ethyl Esters 1 g Oral Cap, Take 1 g by mouth joya Imaging:  Xr Chest Ap Portable  (cpt=71045)    Result Date: 9/9/2020  CONCLUSION:  1. Mild cardiomegaly and new mild pulmonary congestive changes with interstitial edema.   More preferential bibasilar airspace disease right more than left may be rel

## 2020-09-09 NOTE — ED PROVIDER NOTES
Patient Seen in: [facility] AND [facility] Emergency Department      History   Patient presents with:  Dyspnea JOSELUIS SOB  Abdominal Pain    Stated Complaint: SOB, Abd Pain     HPI    53-year-old male with history of ESRD, on dialysis, last dialyzed yesterday, diab reviewed and negative except as noted above.     Physical Exam     ED Triage Vitals [09/09/20 0723]   BP (!) 166/94   Pulse 96   Resp 22   Temp 97.6 °F (36.4 °C)   Temp src Temporal   SpO2 94 %   O2 Device None (Room air)       Current:/88 (BP Locatio Collection Time: 09/09/20  7:41 AM   Result Value Ref Range    Hold Lavender Auto Resulted    RAINBOW DRAW LIGHT GREEN    Collection Time: 09/09/20  7:41 AM   Result Value Ref Range    Hold Lt Green Auto Resulted    RAINBOW DRAW GOLD    Collection Time: FUNCTION PANEL (7)    Collection Time: 09/09/20  7:41 AM   Result Value Ref Range    AST 29 15 - 37 U/L    ALT 25 16 - 61 U/L    Alkaline Phosphatase 54 45 - 117 U/L    Bilirubin, Total 0.4 0.1 - 2.0 mg/dL    Bilirubin, Direct 0.2 0.0 - 0.2 mg/dL    Total recommended decreasing current daily usage, considering nicotine replacement therapy and recommended the patient follow-up with their primary care physician to discuss medications such as Chantix and others to assist with smoking cessation.       The patien Prescribed:  Current Discharge Medication List                       Present on Admission           ICD-10-CM Noted POA    * (Principal) Acute pulmonary edema ([facility] [place] J81.0 9/9/2020 Unknown

## 2020-09-09 NOTE — PROGRESS NOTES
[facility] Pharmacy Note:  Renal Adjustment for piperacillin/tazobactam (Toma Chandler)    Valeria Pressley is a 64year old patient who has been prescribed piperacillin/tazobactam (ZOSYN) 4.5 mg every 8 hrs.   CrCl is estimated creatinine clearance is 12 mL/min (A) (bas

## 2020-09-09 NOTE — SIGNIFICANT EVENT
Called to room for rapid response. Pt became acutely sob. He was started on NRB and was satting 92%. He was unable to talk he felt so sob. His CXR this am showed b/l effusions. He received lasix iv in the ER and was able to urinate.    He is on dialys

## 2020-09-09 NOTE — ANESTHESIA PROCEDURE NOTES
Airway  Date/Time: 9/9/2020 1:25 PM  Urgency: emergent    Airway not difficult    General Information and Staff    Patient location during procedure: floor  Anesthesiologist: Ruthie Gonzales MD  Performed: anesthesiologist     Consent for Airway (if perf

## 2020-09-09 NOTE — RESPIRATORY THERAPY NOTE
Patient on ventilator settings CPAP 14/5, triggering apnea alarms, placed back on full support A/C 12/600/+5/40%. Respiratory status stable and tolerating ventilator well.

## 2020-09-09 NOTE — CONSULTS
[facility]  Cardiology Consultation    Laine Zafar Location: [facility] 3W/SW    1960 MRN [id number]   Consulting Date 2020 DARYN [id number]   Consulting Physician Arielle Shaw short of breath. He does weigh himself prior to dialysis and yesterday he was 126.4 kg which is below his usual dry weight. He states that his dry weight is 127.5 kg. He believes that only 1 L was removed during dialysis yesterday.   He denies chest pain 55198 units Oral Cap, Take 50,000 Units by mouth once a week. Takes with dialysis   Omega-3-acid Ethyl Esters 1 g Oral Cap, Take 1 g by mouth daily. allopurinol 100 MG Oral Tab, Take 100 mg by mouth daily.   apixaban 2.5 MG Oral Tab, Take 5 mg by mouth 2 ( facial droop, or gross motor deficits. Psychiatric: Coooperative, calm.     Laboratory Data:  ECG: SR 80, ST depression/inv T waves V4-V6      IMPRESSIONS:  1) Acute on chronic systolic CHF  Last EF 18-61%  Pro BNP 43, 268  CXR mild CHF    2) ESRD    3) H/

## 2020-09-09 NOTE — PROGRESS NOTES
[facility]    Brief Note    Sim Kranthi Patient Status:  Inpatient    1960 MRN [id number]   Location [facility] 3W/SW Attending Margareth Amador # 0 PCP Galen Purvis     Date of Note:  2020  Ti

## 2020-09-10 ENCOUNTER — APPOINTMENT (OUTPATIENT)
Dept: CV DIAGNOSTICS | Facility: HOSPITAL | Age: 60
DRG: 291 | End: 2020-09-10
Attending: NURSE PRACTITIONER
Payer: MEDICARE

## 2020-09-10 ENCOUNTER — APPOINTMENT (OUTPATIENT)
Dept: CT IMAGING | Facility: HOSPITAL | Age: 60
DRG: 291 | End: 2020-09-10
Attending: INTERNAL MEDICINE
Payer: MEDICARE

## 2020-09-10 ENCOUNTER — APPOINTMENT (OUTPATIENT)
Dept: GENERAL RADIOLOGY | Facility: HOSPITAL | Age: 60
DRG: 291 | End: 2020-09-10
Attending: INTERNAL MEDICINE
Payer: MEDICARE

## 2020-09-10 LAB
ANION GAP SERPL CALC-SCNC: 10 MMOL/L (ref 0–18)
APTT PPP: 105.2 SECONDS (ref 23.2–35.3)
APTT PPP: 55.1 SECONDS (ref 23.2–35.3)
APTT PPP: 84.7 SECONDS (ref 23.2–35.3)
BASOPHILS # BLD AUTO: 0.04 X10(3) UL (ref 0–0.2)
BASOPHILS NFR BLD AUTO: 0.5 %
BUN BLD-MCNC: 30 MG/DL (ref 7–18)
BUN/CREAT SERPL: 4.9 (ref 10–20)
CALCIUM BLD-MCNC: 8.9 MG/DL (ref 8.5–10.1)
CHLORIDE SERPL-SCNC: 102 MMOL/L (ref 98–112)
CO2 SERPL-SCNC: 24 MMOL/L (ref 21–32)
CREAT BLD-MCNC: 6.17 MG/DL (ref 0.7–1.3)
DEPRECATED RDW RBC AUTO: 47.2 FL (ref 35.1–46.3)
EOSINOPHIL # BLD AUTO: 0.04 X10(3) UL (ref 0–0.7)
EOSINOPHIL NFR BLD AUTO: 0.5 %
ERYTHROCYTE [DISTWIDTH] IN BLOOD BY AUTOMATED COUNT: 13.9 % (ref 11–15)
GLUCOSE BLD-MCNC: 103 MG/DL (ref 70–99)
GLUCOSE BLDC GLUCOMTR-MCNC: 82 MG/DL (ref 70–99)
GLUCOSE BLDC GLUCOMTR-MCNC: 83 MG/DL (ref 70–99)
GLUCOSE BLDC GLUCOMTR-MCNC: 92 MG/DL (ref 70–99)
GLUCOSE BLDC GLUCOMTR-MCNC: 94 MG/DL (ref 70–99)
HCT VFR BLD AUTO: 28.6 % (ref 39–53)
HGB BLD-MCNC: 9.7 G/DL (ref 13–17.5)
IMM GRANULOCYTES # BLD AUTO: 0.02 X10(3) UL (ref 0–1)
IMM GRANULOCYTES NFR BLD: 0.2 %
LYMPHOCYTES # BLD AUTO: 0.95 X10(3) UL (ref 1–4)
LYMPHOCYTES NFR BLD AUTO: 10.8 %
MCH RBC QN AUTO: 31.1 PG (ref 26–34)
MCHC RBC AUTO-ENTMCNC: 33.9 G/DL (ref 31–37)
MCV RBC AUTO: 91.7 FL (ref 80–100)
MONOCYTES # BLD AUTO: 0.93 X10(3) UL (ref 0.1–1)
MONOCYTES NFR BLD AUTO: 10.5 %
NEUTROPHILS # BLD AUTO: 6.84 X10 (3) UL (ref 1.5–7.7)
NEUTROPHILS # BLD AUTO: 6.84 X10(3) UL (ref 1.5–7.7)
NEUTROPHILS NFR BLD AUTO: 77.5 %
OSMOLALITY SERPL CALC.SUM OF ELEC: 288 MOSM/KG (ref 275–295)
PLATELET # BLD AUTO: 115 10(3)UL (ref 150–450)
PLATELET # BLD AUTO: 115 10(3)UL (ref 150–450)
POTASSIUM SERPL-SCNC: 4.8 MMOL/L (ref 3.5–5.1)
RBC # BLD AUTO: 3.12 X10(6)UL (ref 4.3–5.7)
SARS-COV-2 RNA RESP QL NAA+PROBE: NOT DETECTED
SODIUM SERPL-SCNC: 136 MMOL/L (ref 136–145)
WBC # BLD AUTO: 8.8 X10(3) UL (ref 4–11)

## 2020-09-10 PROCEDURE — 99232 SBSQ HOSP IP/OBS MODERATE 35: CPT | Performed by: INTERNAL MEDICINE

## 2020-09-10 PROCEDURE — 99291 CRITICAL CARE FIRST HOUR: CPT | Performed by: INTERNAL MEDICINE

## 2020-09-10 PROCEDURE — 93306 TTE W/DOPPLER COMPLETE: CPT | Performed by: NURSE PRACTITIONER

## 2020-09-10 PROCEDURE — 71260 CT THORAX DX C+: CPT | Performed by: INTERNAL MEDICINE

## 2020-09-10 PROCEDURE — 71045 X-RAY EXAM CHEST 1 VIEW: CPT | Performed by: INTERNAL MEDICINE

## 2020-09-10 RX ORDER — ALBUMIN (HUMAN) 12.5 G/50ML
100 SOLUTION INTRAVENOUS AS NEEDED
Status: DISCONTINUED | OUTPATIENT
Start: 2020-09-10 | End: 2020-09-12 | Stop reason: ALTCHOICE

## 2020-09-10 NOTE — H&P
[facility]    PATIENT'S NAME: Karolyn Buddy   ATTENDING PHYSICIAN: Domenico Bangura MD   PATIENT ACCOUNT#:   [id number]    LOCATION:  [address] [place] [address] #:   [id number]       YOB: 1960  ADMISSION DATE:       09/09 1.   Acute pulmonary edema/acute respiratory failure: On mechanical ventilator. Pulmonary is following. Could be due to congestive heart failure versus pneumonia. Started on IV antibiotics and received emergent dialysis.     2.   Hypertension, con

## 2020-09-10 NOTE — PROGRESS NOTES
[facility]    Progress Note    Vadim Franco Patient Status:  Inpatient    1960 MRN [id number]   Location [facility] 2W/SW Attending [address], [address] Day # 1 PCP Lia Bunn       Subjective:   Josie Murillo Chest Ap Portable  (cpt=71045)    Result Date: 9/10/2020  CONCLUSION:  1. Small bilateral pleural effusions and associated bibasilar opacities, which may reflect atelectasis and/or superimposed pneumonia. 2. Additional support tubes and lines as above.

## 2020-09-10 NOTE — PROGRESS NOTES
Pulmonary/Critical Care Follow Up Note    HPI:   Shawn Sequeira is a 64year old male with Patient presents with:  Dyspnea JOSELUIS SOB  Abdominal Pain      PCP Michi Armendariz  Admission Attending Estefani Moreno 15 Day #1    No events 4.5 g in dextrose 5 % 100 mL MBP/ADD-vantage, 4.5 g, Intravenous, Q12H    Facility-Administered Medications Ordered in Other Encounters:   •  propofol (DIPRIVAN) injection, , Intravenous, PRN  •  Rocuronium Bromide (ZEMURON) injection, , Intravenous, PRN hep gtt  Plan ok to cont hep gtt     DVT px  Hep gtt        32 min CCT     D/w RN      Christina Beavers MD

## 2020-09-10 NOTE — DIETARY NOTE
ADULT NUTRITION INITIAL ASSESSMENT    Pt is at high nutrition risk. Pt does not meet malnutrition criteria. RECOMMENDATIONS TO MD:  See Nutrition Intervention for EN support rx. Enteral order set in place.       NUTRITION DIAGNOSIS/PROBLEM:  Marie Screws Nutrition: Nepro 25 ml/hr x ave 22 hr infusion time and advance 10 ml increments q 4 hrs to goal 35 ml/hr + Prosource begin 1 packets TID. This will provide pt ~ 1506 kcals, 86 gms protein, 700 ml h20, 82% RDI's, FWF: 30 ml q 4 hrs (180 ml).  Total h20/24 h • Continuous dose Heparin infusion 1,900 Units/hr (09/10/20 1031)   • propofol 24.86 mcg/kg/min (09/10/20 1400)       • amLODIPine Besylate  7.5 mg Oral Daily   • aspirin  81 mg Oral Daily   • carvedilol  25 mg Oral BID with meals   • Losartan Potassium

## 2020-09-10 NOTE — PLAN OF CARE
Problem: Patient Centered Care  Goal: Patient preferences are identified and integrated in the patient's plan of care  Description  Interventions:  - What would you like us to know as we care for you?  - Provide timely, complete, and accurate information t for assistance with activity based on assessment  - Modify environment to reduce risk of injury  - Provide assistive devices as appropriate  - Consider OT/PT consult to assist with strengthening/mobility  - Encourage toileting schedule  Outcome: Progressin drowsy/follows commands when sedation was off, FIO2 at 40% with o2 sat at %, OG placed and connected to LIS per MD order. Remains on heparin gtt x PE/DVT prophylaxis. Updated wife regarding pt's status.

## 2020-09-10 NOTE — PLAN OF CARE
Pt remains on restraints to prevent pulling lines and tubes.     Problem: Safety Risk - Non-Violent Restraints  Goal: Patient will remain free from self-harm  Description  INTERVENTIONS:  - Apply the least restrictive restraint to prevent harm  - Notify pat

## 2020-09-10 NOTE — PLAN OF CARE
Off sedation in morning for couple hours. Alert cooperative moving all extremities, nodding head to questions. Blood pressure increased. Many oral and ETT secretions requiring constant suctioning. Coughing constantly. Restraints removed  for short time.  Pa Reorient and redirection as needed  - Assess for the need to continue restraints  Outcome: Progressing     Problem: PAIN - ADULT  Goal: Verbalizes/displays adequate comfort level or patient's stated pain goal  Description  INTERVENTIONS:  - Encourage pt to patient/family/discharge partner in discharge planning  - Arrange for needed discharge resources and transportation as appropriate  - Identify discharge learning needs (meds, wound care, etc)  - Arrange for interpreters to assist at discharge as needed  - interventions  Outcome: Progressing     Problem: CARDIOVASCULAR - ADULT  Goal: Maintains optimal cardiac output and hemodynamic stability  Description  INTERVENTIONS:  - Monitor vital signs, rhythm, and trends  - Monitor for bleeding, hypotension and signs volume status, including labs, urine output, blood pressure (other measures as available)  - Encourage oral intake as appropriate  - Instruct patient on fluid and nutrition restrictions as appropriate  Outcome: Progressing     Problem: SKIN/TISSUE INTEGRIT

## 2020-09-10 NOTE — RESPIRATORY THERAPY NOTE
Received patient on full vent support on the following settings VC/AC 12/600/+5/40%. Patient suctioned as needed saturating appropriately. No weaning/ acute events overnight. RT will continue to monitor.

## 2020-09-10 NOTE — PROGRESS NOTES
[facility]  Progress Note    Shawn Sequeira Patient Status:  Inpatient    1960 MRN [id number]   Location [facility] 2W/SW Attending Margareth Amador # 1 PCP [facility]     Assessment:    1.   Acute respirator Labs:  Lab Results   Component Value Date    WBC 8.8 09/10/2020    HGB 9.7 09/10/2020    HCT 28.6 09/10/2020    .0 09/10/2020    .0 09/10/2020     Lab Results   Component Value Date    PT 11.9 12/02/2015    INR 0.9 12/02/2015     Lab Resu

## 2020-09-11 ENCOUNTER — APPOINTMENT (OUTPATIENT)
Dept: GENERAL RADIOLOGY | Facility: HOSPITAL | Age: 60
DRG: 291 | End: 2020-09-11
Attending: INTERNAL MEDICINE
Payer: MEDICARE

## 2020-09-11 LAB
ALBUMIN SERPL-MCNC: 2.8 G/DL (ref 3.4–5)
ANION GAP SERPL CALC-SCNC: 9 MMOL/L (ref 0–18)
APTT PPP: 121.4 SECONDS (ref 23.2–35.3)
APTT PPP: 47.9 SECONDS (ref 23.2–35.3)
BASOPHILS # BLD AUTO: 0.03 X10(3) UL (ref 0–0.2)
BASOPHILS NFR BLD AUTO: 0.4 %
BUN BLD-MCNC: 27 MG/DL (ref 7–18)
BUN/CREAT SERPL: 4.5 (ref 10–20)
CALCIUM BLD-MCNC: 9.2 MG/DL (ref 8.5–10.1)
CHLORIDE SERPL-SCNC: 97 MMOL/L (ref 98–112)
CO2 SERPL-SCNC: 29 MMOL/L (ref 21–32)
CREAT BLD-MCNC: 5.94 MG/DL (ref 0.7–1.3)
DEPRECATED RDW RBC AUTO: 47.5 FL (ref 35.1–46.3)
EOSINOPHIL # BLD AUTO: 0.07 X10(3) UL (ref 0–0.7)
EOSINOPHIL NFR BLD AUTO: 1 %
ERYTHROCYTE [DISTWIDTH] IN BLOOD BY AUTOMATED COUNT: 14.1 % (ref 11–15)
GLUCOSE BLD-MCNC: 122 MG/DL (ref 70–99)
GLUCOSE BLDC GLUCOMTR-MCNC: 120 MG/DL (ref 70–99)
GLUCOSE BLDC GLUCOMTR-MCNC: 127 MG/DL (ref 70–99)
GLUCOSE BLDC GLUCOMTR-MCNC: 138 MG/DL (ref 70–99)
GLUCOSE BLDC GLUCOMTR-MCNC: 164 MG/DL (ref 70–99)
GLUCOSE BLDC GLUCOMTR-MCNC: 182 MG/DL (ref 70–99)
HAV IGM SER QL: 2.5 MG/DL (ref 1.6–2.6)
HCT VFR BLD AUTO: 33.4 % (ref 39–53)
HGB BLD-MCNC: 11.3 G/DL (ref 13–17.5)
IMM GRANULOCYTES # BLD AUTO: 0.03 X10(3) UL (ref 0–1)
IMM GRANULOCYTES NFR BLD: 0.4 %
LYMPHOCYTES # BLD AUTO: 0.98 X10(3) UL (ref 1–4)
LYMPHOCYTES NFR BLD AUTO: 14.2 %
MCH RBC QN AUTO: 31 PG (ref 26–34)
MCHC RBC AUTO-ENTMCNC: 33.8 G/DL (ref 31–37)
MCV RBC AUTO: 91.8 FL (ref 80–100)
MONOCYTES # BLD AUTO: 0.83 X10(3) UL (ref 0.1–1)
MONOCYTES NFR BLD AUTO: 12 %
NEUTROPHILS # BLD AUTO: 4.97 X10 (3) UL (ref 1.5–7.7)
NEUTROPHILS # BLD AUTO: 4.97 X10(3) UL (ref 1.5–7.7)
NEUTROPHILS NFR BLD AUTO: 72 %
OSMOLALITY SERPL CALC.SUM OF ELEC: 286 MOSM/KG (ref 275–295)
PHOSPHATE SERPL-MCNC: 5.7 MG/DL (ref 2.5–4.9)
PLATELET # BLD AUTO: 108 10(3)UL (ref 150–450)
PLATELET # BLD AUTO: 108 10(3)UL (ref 150–450)
POTASSIUM SERPL-SCNC: 3.8 MMOL/L (ref 3.5–5.1)
RBC # BLD AUTO: 3.64 X10(6)UL (ref 4.3–5.7)
SARS-COV-2 RNA RESP QL NAA+PROBE: NOT DETECTED
SODIUM SERPL-SCNC: 135 MMOL/L (ref 136–145)
WBC # BLD AUTO: 6.9 X10(3) UL (ref 4–11)

## 2020-09-11 PROCEDURE — 99232 SBSQ HOSP IP/OBS MODERATE 35: CPT | Performed by: INTERNAL MEDICINE

## 2020-09-11 PROCEDURE — 71045 X-RAY EXAM CHEST 1 VIEW: CPT | Performed by: INTERNAL MEDICINE

## 2020-09-11 PROCEDURE — 99291 CRITICAL CARE FIRST HOUR: CPT | Performed by: INTERNAL MEDICINE

## 2020-09-11 RX ORDER — ALBUTEROL SULFATE 2.5 MG/3ML
2.5 SOLUTION RESPIRATORY (INHALATION)
Status: DISCONTINUED | OUTPATIENT
Start: 2020-09-11 | End: 2020-09-12

## 2020-09-11 RX ORDER — HEPARIN SODIUM AND DEXTROSE 10000; 5 [USP'U]/100ML; G/100ML
1000 INJECTION INTRAVENOUS ONCE
Status: COMPLETED | OUTPATIENT
Start: 2020-09-11 | End: 2020-09-11

## 2020-09-11 RX ORDER — HYDRALAZINE HYDROCHLORIDE 10 MG/1
10 TABLET, FILM COATED ORAL EVERY 8 HOURS SCHEDULED
Status: DISCONTINUED | OUTPATIENT
Start: 2020-09-11 | End: 2020-09-12

## 2020-09-11 RX ORDER — ISOSORBIDE DINITRATE 20 MG/1
20 TABLET ORAL
Status: DISCONTINUED | OUTPATIENT
Start: 2020-09-11 | End: 2020-09-13

## 2020-09-11 RX ORDER — ALBUMIN (HUMAN) 12.5 G/50ML
100 SOLUTION INTRAVENOUS AS NEEDED
Status: DISCONTINUED | OUTPATIENT
Start: 2020-09-12 | End: 2020-09-13

## 2020-09-11 RX ORDER — HEPARIN SODIUM 5000 [USP'U]/ML
5000 INJECTION, SOLUTION INTRAVENOUS; SUBCUTANEOUS EVERY 8 HOURS SCHEDULED
Status: DISCONTINUED | OUTPATIENT
Start: 2020-09-11 | End: 2020-09-11

## 2020-09-11 RX ORDER — HEPARIN SODIUM 1000 [USP'U]/ML
5000 INJECTION, SOLUTION INTRAVENOUS; SUBCUTANEOUS ONCE
Status: COMPLETED | OUTPATIENT
Start: 2020-09-11 | End: 2020-09-11

## 2020-09-11 RX ORDER — HEPARIN SODIUM AND DEXTROSE 10000; 5 [USP'U]/100ML; G/100ML
INJECTION INTRAVENOUS CONTINUOUS
Status: DISCONTINUED | OUTPATIENT
Start: 2020-09-11 | End: 2020-09-12

## 2020-09-11 RX ORDER — ALBUTEROL SULFATE 90 UG/1
4 AEROSOL, METERED RESPIRATORY (INHALATION)
Status: DISCONTINUED | OUTPATIENT
Start: 2020-09-11 | End: 2020-09-11

## 2020-09-11 NOTE — RESPIRATORY THERAPY NOTE
I received pt vented on AC 12/600/30% +5, 8.0/26 cm at the lips, suctioned as needed, diminished bs bilateral, pt is alert with good gag reflex. Goal is to attempt SBT and possibly extubate. RT will continue to monitor pt.

## 2020-09-11 NOTE — PROGRESS NOTES
Patient has a history of recurrent pulmonary embolus. No current pulmonary embolus. Will decrease heparin gtt to low dose. Resume Eliquis when taking PO. Should be on 5mg bid.

## 2020-09-11 NOTE — PROGRESS NOTES
[place] [facility]    Progress Note    Pascual Sandoval Patient Status:  Inpatient    1960 MRN [id number]   Location [facility] 2W/SW Attending [address], [address] [facility] Day # 2 PCP Sabine Whelan       Subjective:   Geovanna Cuevas 09/09/2020    .4 (H) 09/11/2020    INR 0.9 12/02/2015    PT 11.9 12/02/2015    MG 2.5 09/11/2020    PHOS 5.7 (H) 09/11/2020    TROP <0.045 09/09/2020       Xr Chest Ap Portable  (cpt=71045)    Result Date: 9/11/2020  CONCLUSION:  1.  No significant c right pleural space. 4. Bibasilar probable atelectasis, with or without superimposed pneumonia. 5. Inhomogeneous ground-glass opacities are present bilaterally and may reflect pulmonary interstitial edema.   6. Bronchial wall thickening and mucus/debris m

## 2020-09-11 NOTE — PROGRESS NOTES
Patient extubated at 1130 per MD orders with RT at bedside. No complications. No distress. Patient A&Ox4, following commands.

## 2020-09-11 NOTE — PLAN OF CARE
Patient extubated at 1130am. O2 weaned to 2L NC, no distress noted. Lungs diminished- CDB encouraged. Patient up ambulating to bathroom, sitting up in chair. Tolerating well. Passed swallow eval. No complaints of pain. Safety maintained.  Remains on heparin growth factors as ordered  - Implement neutropenic guidelines  Outcome: Progressing     Problem: SAFETY ADULT - FALL  Goal: Free from fall injury  Description  INTERVENTIONS:  - Assess pt frequently for physical needs  - Identify cognitive and physical def Provide Smoking Cessation handout, if applicable  - Encourage broncho-pulmonary hygiene including cough, deep breathe, Incentive Spirometry  - Assess the need for suctioning and perform as needed  - Assess and instruct to report SOB or any respiratory diff needed  - Instruct patient on self management of diabetes  Outcome: Progressing  Goal: Electrolytes maintained within normal limits  Description  INTERVENTIONS:  - Monitor labs and rhythm and assess patient for signs and symptoms of electrolyte imbalances

## 2020-09-11 NOTE — PROGRESS NOTES
Pulmonary/Critical Care Follow Up Note    HPI:   Shawn Sequeira is a 64year old male with Patient presents with:  Dyspnea JOSELUIS SOB  Abdominal Pain      PCP Michi Armendariz  Admission Attending Estefani Moreno 15 Day #2    No events 5-100 mcg/kg/min, Intravenous, Continuous  •  morphINE sulfate (PF) 2 MG/ML injection 2 mg, 2 mg, Intravenous, Q2H PRN  •  hydrALAzine HCl (APRESOLINE) injection 20 mg, 20 mg, Intravenous, Q4H PRN  •  Piperacillin Sod-Tazobactam So (ZOSYN) 4.5 g in dextros follow for now     GERD  Plan PPI     H/o PE in past  Current presentation not c/w PE  On apixban at home  Started on hep gtt now off  Plan re evaluate if pt still needs full dose A/C     DVT px  Hep SQ        31 min CCT     D/w RN and RT      Zoey Yung

## 2020-09-11 NOTE — SLP NOTE
ADULT SWALLOWING EVALUATION    ASSESSMENT    ASSESSMENT/OVERALL IMPRESSION:  PPE REQUIRED. THIS SLP WORE GLOVES AND DROPLET MASK. HANDS SANITIZED/WASHED UPON ENTRANCE/EXIT. SLP BSSE orders received and acknowledged.  A swallow evaluation warranted as Precautions: Upright position; Slow rate;Small bites and sips; No straw  Medication Administration Recommendations: One pill at a time  Treatment Plan/Recommendations: Aspiration precautions  Discharge Recommendations/Plan: Undetermined    HISTORY   MEDICAL Upright;Midline    Oral Phase of Swallow: Within Functional Limits    Pharyngeal Phase of Swallow: Within Functional Limits  (Please note: Silent aspiration cannot be evaluated clinically.  Videofluoroscopic Swallow Study is required to rule-out silent aspi

## 2020-09-11 NOTE — PROGRESS NOTES
[facility]    Progress Note    Trino Eng Patient Status:  Inpatient    1960 MRN [id number]   Location [facility] 2W/SW Attending [address], [address] Day # 2 PCP Julayaz Space       Subjective:   Sarah Crystal 5-100 mcg/kg/min, Intravenous, Continuous  morphINE sulfate (PF) 2 MG/ML injection 2 mg, 2 mg, Intravenous, Q2H PRN  Albuterol Sulfate  (90 Base) MCG/ACT inhaler 2 puff, 2 puff, Inhalation, 4 times per day  hydrALAzine HCl (APRESOLINE) injection 20 tube placement with tip in the proximal-mid gastric body.     Dictated by (CST): Armando Cruz MD on 9/09/2020 at 9:45 PM     Finalized by (CST): Armando Cruz MD on 9/09/2020 at 9:46 PM          Xr Chest Ap Portable  (cpt=71045)    Result Date: 9/9/2020  CON 09/09/2020 at 17:55 by Luci Dominguez MD    Ekg 12-lead    Result Date: 9/9/2020  ECG Report  Interpretation  -------------------------- Sinus Rhythm -Left atrial enlargement.  -Nonspecific ST depression + Negative T-waves.  ABNORMAL When compared with ECG of

## 2020-09-11 NOTE — PROGRESS NOTES
[facility]  Progress Note    Anup Bautista Patient Status:  Inpatient    1960 MRN [id number]   Location [facility] 2W/SW Attending Margareth Amador # 2 PCP [facility]     Assessment:    1.   Acute respirator Component Value Date    WBC 6.9 09/11/2020    HGB 11.3 09/11/2020    HCT 33.4 09/11/2020    .0 09/11/2020    .0 09/11/2020     Lab Results   Component Value Date    PT 11.9 12/02/2015    INR 0.9 12/02/2015     Lab Results   Component Value

## 2020-09-11 NOTE — PLAN OF CARE
Problem: PAIN - ADULT  Goal: Verbalizes/displays adequate comfort level or patient's stated pain goal  Description  INTERVENTIONS:  - Encourage pt to monitor pain and request assistance  - Assess pain using appropriate pain scale  - Administer analgesics cardiac output and hemodynamic stability  Description  INTERVENTIONS:  - Monitor vital signs, rhythm, and trends  - Monitor for bleeding, hypotension and signs of decreased cardiac output  - Evaluate effectiveness of vasoactive medications to optimize hemo redirection as needed  - Assess for the need to continue restraints  Outcome: Not Progressing  Note:   Pt orally intubated and dependent on mechanical vent, on soft wrist restraints to prevent accidental self-extubation.      Problem: RESPIRATORY - ADULT  G

## 2020-09-12 LAB
ANION GAP SERPL CALC-SCNC: 10 MMOL/L (ref 0–18)
ANION GAP SERPL CALC-SCNC: 13 MMOL/L (ref 0–18)
APTT PPP: 46.1 SECONDS (ref 23.2–35.3)
APTT PPP: 60.4 SECONDS (ref 23.2–35.3)
BUN BLD-MCNC: 24 MG/DL (ref 7–18)
BUN BLD-MCNC: 45 MG/DL (ref 7–18)
BUN/CREAT SERPL: 4.2 (ref 10–20)
BUN/CREAT SERPL: 5.3 (ref 10–20)
CALCIUM BLD-MCNC: 9.3 MG/DL (ref 8.5–10.1)
CALCIUM BLD-MCNC: 9.5 MG/DL (ref 8.5–10.1)
CHLORIDE SERPL-SCNC: 96 MMOL/L (ref 98–112)
CHLORIDE SERPL-SCNC: 99 MMOL/L (ref 98–112)
CO2 SERPL-SCNC: 25 MMOL/L (ref 21–32)
CO2 SERPL-SCNC: 26 MMOL/L (ref 21–32)
CREAT BLD-MCNC: 5.72 MG/DL (ref 0.7–1.3)
CREAT BLD-MCNC: 8.52 MG/DL (ref 0.7–1.3)
DEPRECATED RDW RBC AUTO: 46.7 FL (ref 35.1–46.3)
DEPRECATED RDW RBC AUTO: 47.4 FL (ref 35.1–46.3)
ERYTHROCYTE [DISTWIDTH] IN BLOOD BY AUTOMATED COUNT: 13.9 % (ref 11–15)
ERYTHROCYTE [DISTWIDTH] IN BLOOD BY AUTOMATED COUNT: 14 % (ref 11–15)
GLUCOSE BLD-MCNC: 201 MG/DL (ref 70–99)
GLUCOSE BLD-MCNC: 94 MG/DL (ref 70–99)
GLUCOSE BLDC GLUCOMTR-MCNC: 100 MG/DL (ref 70–99)
GLUCOSE BLDC GLUCOMTR-MCNC: 99 MG/DL (ref 70–99)
HCT VFR BLD AUTO: 29.5 % (ref 39–53)
HCT VFR BLD AUTO: 30.3 % (ref 39–53)
HGB BLD-MCNC: 10.1 G/DL (ref 13–17.5)
HGB BLD-MCNC: 10.5 G/DL (ref 13–17.5)
MCH RBC QN AUTO: 31.2 PG (ref 26–34)
MCH RBC QN AUTO: 31.9 PG (ref 26–34)
MCHC RBC AUTO-ENTMCNC: 34.2 G/DL (ref 31–37)
MCHC RBC AUTO-ENTMCNC: 34.7 G/DL (ref 31–37)
MCV RBC AUTO: 91 FL (ref 80–100)
MCV RBC AUTO: 92.1 FL (ref 80–100)
OSMOLALITY SERPL CALC.SUM OF ELEC: 288 MOSM/KG (ref 275–295)
OSMOLALITY SERPL CALC.SUM OF ELEC: 291 MOSM/KG (ref 275–295)
PLATELET # BLD AUTO: 111 10(3)UL (ref 150–450)
PLATELET # BLD AUTO: 111 10(3)UL (ref 150–450)
PLATELET # BLD AUTO: 130 10(3)UL (ref 150–450)
POTASSIUM SERPL-SCNC: 3.8 MMOL/L (ref 3.5–5.1)
POTASSIUM SERPL-SCNC: 4.2 MMOL/L (ref 3.5–5.1)
RBC # BLD AUTO: 3.24 X10(6)UL (ref 4.3–5.7)
RBC # BLD AUTO: 3.29 X10(6)UL (ref 4.3–5.7)
SODIUM SERPL-SCNC: 134 MMOL/L (ref 136–145)
SODIUM SERPL-SCNC: 135 MMOL/L (ref 136–145)
WBC # BLD AUTO: 7.2 X10(3) UL (ref 4–11)
WBC # BLD AUTO: 8.1 X10(3) UL (ref 4–11)

## 2020-09-12 PROCEDURE — 99233 SBSQ HOSP IP/OBS HIGH 50: CPT | Performed by: INTERNAL MEDICINE

## 2020-09-12 PROCEDURE — 99232 SBSQ HOSP IP/OBS MODERATE 35: CPT | Performed by: INTERNAL MEDICINE

## 2020-09-12 RX ORDER — ALBUTEROL SULFATE 2.5 MG/3ML
2.5 SOLUTION RESPIRATORY (INHALATION) EVERY 6 HOURS PRN
Status: DISCONTINUED | OUTPATIENT
Start: 2020-09-12 | End: 2020-09-13

## 2020-09-12 RX ORDER — ALBUMIN (HUMAN) 12.5 G/50ML
100 SOLUTION INTRAVENOUS
Status: DISCONTINUED | OUTPATIENT
Start: 2020-09-14 | End: 2020-09-13

## 2020-09-12 RX ORDER — HYDRALAZINE HYDROCHLORIDE 25 MG/1
25 TABLET, FILM COATED ORAL EVERY 8 HOURS SCHEDULED
Status: DISCONTINUED | OUTPATIENT
Start: 2020-09-12 | End: 2020-09-13

## 2020-09-12 RX ORDER — CINACALCET 30 MG/1
60 TABLET, FILM COATED ORAL
Status: DISCONTINUED | OUTPATIENT
Start: 2020-09-12 | End: 2020-09-13

## 2020-09-12 NOTE — PROGRESS NOTES
[place] [facility] - [facility]    Progress Note    Ree Bud Patient Status:  Inpatient    1960 MRN [id number]   Location [facility] 2W/SW Attending [address], [address] Day # 3 PCP Marlon Franco       Subjective:   Steven Sheppard 2 mg, 2 mg, Intravenous, Q2H PRN  hydrALAzine HCl (APRESOLINE) injection 20 mg, 20 mg, Intravenous, Q4H PRN  Piperacillin Sod-Tazobactam So (ZOSYN) 4.5 g in dextrose 5 % 100 mL MBP/ADD-vantage, 4.5 g, Intravenous, Q12H    propofol (DIPRIVAN) injection, , I 7. Additional support tubes and lines as above. 8. Lesser incidental findings as above.     Dictated by (CST): Elyssa Jones MD on 9/10/2020 at 11:50 AM     Finalized by (CST): Elyssa Jones MD on 9/10/2020 at 11:57 AM                    Assessment an

## 2020-09-12 NOTE — PLAN OF CARE
Problem: PAIN - ADULT  Goal: Verbalizes/displays adequate comfort level or patient's stated pain goal  Description  INTERVENTIONS:  - Encourage pt to monitor pain and request assistance  - Assess pain using appropriate pain scale  - Administer analgesics changes in respiratory status  - Assess for changes in mentation and behavior  - Position to facilitate oxygenation and minimize respiratory effort  - Oxygen supplementation based on oxygen saturation or ABGs  - Provide Smoking Cessation handout, if applic intact  Description  INTERVENTIONS  - Assess and document risk factors for pressure ulcer development  - Assess and document skin integrity  - Monitor for areas of redness and/or skin breakdown  - Initiate interventions, skin care algorithm/standards of ca

## 2020-09-12 NOTE — PROGRESS NOTES
[facility]  Progress Note    Vadim Franco Patient Status:  Inpatient    1960 MRN [id number]   Location [facility] 2W/SW Attending Margareth Amador # 3 PCP [facility]     Assessment:    1.   Acute pulmonary effort. Abdomen: Soft, non-tender. Extremities: Without clubbing, cyanosis or edema. Peripheral pulses are 2+. Skin: Warm and dry.      Labs:  Lab Results   Component Value Date    WBC 8.1 09/12/2020    HGB 10.1 09/12/2020    HCT 29.5 09/12/2020    PLT

## 2020-09-12 NOTE — PROGRESS NOTES
Received patient from PCCU. VSS. Denies pain, dyspnea or weakness. 93% on room air. Ate dinner and resting in bed with no complaints. Will continue to monitor.

## 2020-09-12 NOTE — PROGRESS NOTES
[place] [facility]    Progress Note    Arabella Carljonathan Patient Status:  Inpatient    1960 MRN [id number]   Location [facility] 2W/SW Attending [address], [address] Day # 3 PCP Nathalia Lopez       Subjective:   Lester Linder 09/10/20  0538 09/11/20  0512 09/12/20  0605   * 122* 94   BUN 30* 27* 45*   CREATSERUM 6.17* 5.94* 8.52*   GFRAA 11* 11* 7*   GFRNAA 9* 10* 6*   CA 8.9 9.2 9.3    135* 135*   K 4.8 3.8 4.2    97* 96*   CO2 24.0 29.0 26.0          Xr Shahnaz

## 2020-09-12 NOTE — PLAN OF CARE
Problem: Patient Centered Care  Goal: Patient preferences are identified and integrated in the patient's plan of care  Description  Interventions:  - What would you like us to know as we care for you? per wife.  Mckayla Fuentes was at [facility] for breathing problems tw to Case Management Department for coordinating discharge planning if the patient needs post-hospital services based on physician/LIP order or complex needs related to functional status, cognitive ability or social support system  Outcome: Progressing     P

## 2020-09-12 NOTE — PROGRESS NOTES
[facility]    Progress Note    Max Jasvir Patient Status:  Inpatient    1960 MRN [id number]   Location [facility] 2W/SW Attending Margareth Day # 3 PCP SAMMI BREWSTER        Subjective:     Con TP 7.8 09/09/2020    AST 29 09/09/2020    ALT 25 09/09/2020    PTT 60.4 (H) 09/12/2020    INR 0.9 12/02/2015    PT 11.9 12/02/2015    MG 2.5 09/11/2020    PHOS 5.7 (H) 09/11/2020    TROP <0.045 09/09/2020       Xr Chest Ap Portable  (cpt=71045)    Resul

## 2020-09-12 NOTE — PROGRESS NOTES
Double RN skin check done prior to transfer off unit. Skin check performed by they RN and Nicko.    Wounds are as follows: none    Will remain available for any further questions or concerns.

## 2020-09-13 VITALS
WEIGHT: 263.81 LBS | HEIGHT: 74.02 IN | OXYGEN SATURATION: 98 % | DIASTOLIC BLOOD PRESSURE: 83 MMHG | HEART RATE: 95 BPM | BODY MASS INDEX: 33.86 KG/M2 | SYSTOLIC BLOOD PRESSURE: 169 MMHG | TEMPERATURE: 99 F | RESPIRATION RATE: 20 BRPM

## 2020-09-13 LAB
ALBUMIN SERPL-MCNC: 2.7 G/DL (ref 3.4–5)
ANION GAP SERPL CALC-SCNC: 8 MMOL/L (ref 0–18)
BASOPHILS # BLD AUTO: 0.04 X10(3) UL (ref 0–0.2)
BASOPHILS NFR BLD AUTO: 0.5 %
BUN BLD-MCNC: 35 MG/DL (ref 7–18)
BUN/CREAT SERPL: 4.5 (ref 10–20)
CALCIUM BLD-MCNC: 9.6 MG/DL (ref 8.5–10.1)
CHLORIDE SERPL-SCNC: 102 MMOL/L (ref 98–112)
CO2 SERPL-SCNC: 25 MMOL/L (ref 21–32)
CREAT BLD-MCNC: 7.72 MG/DL (ref 0.7–1.3)
DEPRECATED RDW RBC AUTO: 47.8 FL (ref 35.1–46.3)
EOSINOPHIL # BLD AUTO: 0.1 X10(3) UL (ref 0–0.7)
EOSINOPHIL NFR BLD AUTO: 1.2 %
ERYTHROCYTE [DISTWIDTH] IN BLOOD BY AUTOMATED COUNT: 14.1 % (ref 11–15)
GLUCOSE BLD-MCNC: 96 MG/DL (ref 70–99)
GLUCOSE BLDC GLUCOMTR-MCNC: 123 MG/DL (ref 70–99)
GLUCOSE BLDC GLUCOMTR-MCNC: 129 MG/DL (ref 70–99)
HCT VFR BLD AUTO: 30.4 % (ref 39–53)
HGB BLD-MCNC: 10.3 G/DL (ref 13–17.5)
IMM GRANULOCYTES # BLD AUTO: 0.07 X10(3) UL (ref 0–1)
IMM GRANULOCYTES NFR BLD: 0.9 %
LYMPHOCYTES # BLD AUTO: 0.97 X10(3) UL (ref 1–4)
LYMPHOCYTES NFR BLD AUTO: 12.1 %
MCH RBC QN AUTO: 31.3 PG (ref 26–34)
MCHC RBC AUTO-ENTMCNC: 33.9 G/DL (ref 31–37)
MCV RBC AUTO: 92.4 FL (ref 80–100)
MONOCYTES # BLD AUTO: 1.23 X10(3) UL (ref 0.1–1)
MONOCYTES NFR BLD AUTO: 15.4 %
NEUTROPHILS # BLD AUTO: 5.6 X10 (3) UL (ref 1.5–7.7)
NEUTROPHILS # BLD AUTO: 5.6 X10(3) UL (ref 1.5–7.7)
NEUTROPHILS NFR BLD AUTO: 69.9 %
OSMOLALITY SERPL CALC.SUM OF ELEC: 288 MOSM/KG (ref 275–295)
PHOSPHATE SERPL-MCNC: 6.5 MG/DL (ref 2.5–4.9)
PLATELET # BLD AUTO: 103 10(3)UL (ref 150–450)
POTASSIUM SERPL-SCNC: 4.1 MMOL/L (ref 3.5–5.1)
RBC # BLD AUTO: 3.29 X10(6)UL (ref 4.3–5.7)
SODIUM SERPL-SCNC: 135 MMOL/L (ref 136–145)
WBC # BLD AUTO: 8 X10(3) UL (ref 4–11)

## 2020-09-13 PROCEDURE — 99232 SBSQ HOSP IP/OBS MODERATE 35: CPT | Performed by: INTERNAL MEDICINE

## 2020-09-13 RX ORDER — ISOSORBIDE DINITRATE 20 MG/1
20 TABLET ORAL
Qty: 90 TABLET | Refills: 0 | Status: SHIPPED | OUTPATIENT
Start: 2020-09-13

## 2020-09-13 NOTE — PROGRESS NOTES
[facility]    Progress Note    Arabella Hall Patient Status:  Inpatient    1960 MRN [id number]   Location [facility] 3W/SW Attending [address], [address] Day # 4 PCP Nathalia Lopez       Subjective:   Lester Linder GLU 94 201* 96   BUN 45* 24* 35*   CREATSERUM 8.52* 5.72* 7.72*   GFRAA 7* 12* 8*   GFRNAA 6* 10* 7*   CA 9.3 9.5 9.6   * 134* 135*   K 4.2 3.8 4.1   CL 96* 99 102   CO2 26.0 25.0 25.0          No results found.         Edgardo Bobby MD  9/13/2020

## 2020-09-13 NOTE — DISCHARGE PLANNING
Reviewed discharge AVS with patient and wife. Removed PICC line. All belongings sent with patient. Plan to follow-up with patient's cardiologist. Discharged stable, self care, home with wife.

## 2020-09-13 NOTE — PROGRESS NOTES
[facility]     [facility] Progress Note    Simon Phillips Patient Status:  Inpatient    1960 MRN [id number]   Location [facility] 3W/SW Attending [address], [address] Day # 4 PCP Jhonny Natarajan     Subj < > 122* 94 201* 96   BUN 45*   < > 27* 45* 24* 35*   CREATSERUM 7.83*   < > 5.94* 8.52* 5.72* 7.72*   GFRAA 8*   < > 11* 7* 12* 8*   GFRNAA 7*   < > 10* 6* 10* 7*   CA 9.7   < > 9.2 9.3 9.5 9.6   ALB 3.0*  --  2.8*  --   --  2.7*   *   < > 135* 135 dysfunction by echo. This is new compared with transplant work-up done last month which showed very mild single-vessel CAD and low normal LV systolic function. 3.  Severe hypertension on admission, improved.   Currently on carvedilol, losartan, hydralaz

## 2020-09-13 NOTE — PLAN OF CARE
VSS, /65 overnight. PICC in place. L arm fistula without issue. Denies pain. Reports breathing has improved. Refused hydralazine-states this is the medication that made his blood pressure drop.      Problem: Patient Centered Care  Goal: Patient prefer injury  Description  INTERVENTIONS:  - Assess pt frequently for physical needs  - Identify cognitive and physical deficits and behaviors that affect risk of falls.   - [place] fall precautions as indicated by assessment.  - Educate pt/family on patient sa Spirometry  - Assess the need for suctioning and perform as needed  - Assess and instruct to report SOB or any respiratory difficulty  - Respiratory Therapy support as indicated  - Manage/alleviate anxiety  - Monitor for signs/symptoms of CO2 retention  Lyndsey Sheriff limits  Description  INTERVENTIONS:  - Monitor labs and rhythm and assess patient for signs and symptoms of electrolyte imbalances  - Administer electrolyte replacement as ordered  - Monitor response to electrolyte replacements, including rhythm and repeat

## 2020-09-13 NOTE — PLAN OF CARE
Patient denies chest pain or dyspnea. Renal & Cards cleared patient for D/C. Attending notified. A&O x4, room air, VSS, diminished urine, ambulates independently. ACHS. Will continue to monitor.      Problem: Patient Centered Care  Goal: Patient preferen FALL  Goal: Free from fall injury  Description  INTERVENTIONS:  - Assess pt frequently for physical needs  - Identify cognitive and physical deficits and behaviors that affect risk of falls.   - [place] fall precautions as indicated by assessment.  - Educ including cough, deep breathe, Incentive Spirometry  - Assess the need for suctioning and perform as needed  - Assess and instruct to report SOB or any respiratory difficulty  - Respiratory Therapy support as indicated  - Manage/alleviate anxiety  - [place] management of diabetes  Outcome: Adequate for Discharge  Goal: Electrolytes maintained within normal limits  Description  INTERVENTIONS:  - Monitor labs and rhythm and assess patient for signs and symptoms of electrolyte imbalances  - Administer electrolyt

## 2020-09-13 NOTE — PROGRESS NOTES
[place] [facility]    Progress Note    Loren Boss Patient Status:  Inpatient    1960 MRN [id number]   Location [facility] 2W/SW Attending [address], [address] [facility] Day # 4 PCP Vanessa Swartz       Subjective:   Bronson Barton injection 2 mg, 2 mg, Intravenous, Q2H PRN  hydrALAzine HCl (APRESOLINE) injection 20 mg, 20 mg, Intravenous, Q4H PRN          Results:     Lab Results   Component Value Date    WBC 8.0 09/13/2020    HGB 10.3 (L) 09/13/2020    HCT 30.4 (L) 09/13/2020    PL

## 2020-09-13 NOTE — PROGRESS NOTES
[place] [facility] - [facility]    Progress Note    Loren Boss Patient Status:  Inpatient    1960 MRN [id number]   Location [facility] 3W/SW Attending Margareth Day # 4 PCP SAMMI BREWSTER        Subjective:     Con K 4.1 09/13/2020     09/13/2020    CO2 25.0 09/13/2020    GLU 96 09/13/2020    CA 9.6 09/13/2020    ALB 2.7 (L) 09/13/2020    ALKPHO 54 09/09/2020    BILT 0.4 09/09/2020    TP 7.8 09/09/2020    AST 29 09/09/2020    ALT 25 09/09/2020    PTT 60.4 (H

## 2020-09-13 NOTE — SLP NOTE
SPEECH/SWALLOWING DAILY NOTE - INPATIENT    ASSESSMENT & PLAN   ASSESSMENT  Pt seen for dysphagia tx to assess tolerance with recommended diet, ensure proper utilization of aspiration precautions and provide pt/family education.  Patient seen with recommend Established Goals: 1    Session: 1 of 1    If you have any questions, please contact [address] [place] [address] Pathologist  [phone]

## 2020-09-14 NOTE — DISCHARGE SUMMARY
[facility]    PATIENT'S NAME: Tianna Trevizoony PHYSICIAN: Katelyn Bonner MD   PATIENT ACCOUNT#:   [id number]    LOCATION:  [address] [place] [address] Road #:   [id number]       YOB: 1960  ADMISSION DATE:       09/09

## 2021-03-04 NOTE — PAYOR COMM NOTE
--------------  DISCHARGE REVIEW    Payor: MEDICARE PART A&B  Subscriber #:  [id number]  Authorization Number: N/A    Admit date: 9/9/20  Admit time:  3685  Discharge Date: 9/13/2020  5:49 PM     Admitting Physician: Gayla Morrison MD  Attendi pneumonia and also treated with  Dialysis. The patient's breathing status improved and later got extubated on 09/11 and was transferred to the medical floor. Patient remained stable. Had swallow evaluation, and then recommended general diet with thinliqui leg swelling or orthopnea or increased weight gain.     Past Medical History:   Diagnosis Date   • Diabetes [facility])    • Dialysis patient [facility])    • Essential hypertension    • Gout    • High blood pressure    • High cholesterol    • Renal failure Regular rhythm. Comments: No leg swelling  Pulmonary:      Effort: Pulmonary effort is normal.      Comments: No conversational dyspnea, no accessory muscle usage  Abdominal:      Palpations: Abdomen is soft. Tenderness:  There is tenderness (epig Ratio 5.7 (L) 10.0 - 20.0    Calcium, Total 9.7 8.5 - 10.1 mg/dL    Calculated Osmolality 288 275 - 295 mOsm/kg    GFR, Non- 7 (L) >=60    GFR, -American 8 (L) >=60   PRO BETA NATRIURETIC PEPTIDE    Collection Time: 09/09/20  7:41 AM and new mild pulmonary congestive changes with interstitial edema. More preferential bibasilar airspace disease right more than left may be related to underlying pulmonary congestion, although bibasilar pneumonia is not excluded.   Correlate clinically and discharge summaries, testing, and procedures, and reviewed those reports. Complicating Factors: The patient already has does not have any pertinent problems on file. to contribute to the complexity of his ED evaluation.         EMERGENCY DEPARTMENT MEDIC [id number] [facility] #:   [id number]       YOB: 1960  ADMISSION DATE:       09/09/2020    HISTORY AND PHYSICAL EXAMINATION    HISTORY OF PRESENT ILLNESS:  The patient is a 61-year-old male with a history of hypertension, type 2 failure versus pneumonia. Started on IV antibiotics and received emergent dialysis. 2.   Hypertension, controlled. 3.   Type 2 diabetes mellitus: Will do Accu-Cheks q.6 and sliding scale insulin.     4.   Deep venous thrombosis prophylaxis:  Since

## 2021-05-03 ENCOUNTER — HOSPITAL ENCOUNTER (EMERGENCY)
Facility: HOSPITAL | Age: 61
Discharge: HOME | End: 2021-05-03
Attending: EMERGENCY MEDICINE
Payer: MEDICARE

## 2021-05-03 ENCOUNTER — APPOINTMENT (OUTPATIENT)
Dept: GENERAL RADIOLOGY | Facility: HOSPITAL | Age: 61
End: 2021-05-03
Attending: EMERGENCY MEDICINE
Payer: MEDICARE

## 2021-05-03 VITALS
BODY MASS INDEX: 34.65 KG/M2 | TEMPERATURE: 98 F | RESPIRATION RATE: 16 BRPM | SYSTOLIC BLOOD PRESSURE: 107 MMHG | HEART RATE: 71 BPM | OXYGEN SATURATION: 95 % | DIASTOLIC BLOOD PRESSURE: 64 MMHG | WEIGHT: 270 LBS | HEIGHT: 74 IN

## 2021-05-03 DIAGNOSIS — R05.9 COUGH: Primary | ICD-10-CM

## 2021-05-03 PROCEDURE — 71045 X-RAY EXAM CHEST 1 VIEW: CPT | Performed by: EMERGENCY MEDICINE

## 2021-05-03 PROCEDURE — 99284 EMERGENCY DEPT VISIT MOD MDM: CPT

## 2021-05-03 RX ORDER — BENZONATATE 100 MG/1
100 CAPSULE ORAL 3 TIMES DAILY PRN
Qty: 30 CAPSULE | Refills: 0 | Status: SHIPPED | OUTPATIENT
Start: 2021-05-03 | End: 2021-06-02

## 2021-05-03 RX ORDER — BENZONATATE 100 MG/1
100 CAPSULE ORAL ONCE
Status: COMPLETED | OUTPATIENT
Start: 2021-05-03 | End: 2021-05-03

## 2021-05-03 NOTE — ED PROVIDER NOTES
Patient Seen in: [facility] AND [facility] Emergency Department    History   Patient presents with:  Cough/URI    Stated Complaint: cough      HPI    68-year-old male with past medical history of CAD, CHF (20% EF), DM, HTN, HL, ESRD (TTS via LUE AVF) presenting fo Ethyl Esters 1 g Oral Cap,  Take 1 g by mouth daily. allopurinol 100 MG Oral Tab,  Take 100 mg by mouth daily. apixaban 2.5 MG Oral Tab,  Take 5 mg by mouth 2 (two) times daily. aspirin 81 MG Oral Tab EC,  Take 81 mg by mouth daily.    Cinacalcet HCl thrill. Pulmonary/Chest: Effort normal. CTAB. Abdominal: Soft. Nontender. Musculoskeletal: No gross deformity. Bilateral lower extremities without calf tenderness or edema. Neurological: Alert. Skin: Skin is warm. Psychiatric: Cooperative.   Nursin [phone] and permanently delete the original report and destroy any copies or printouts.            MDM     DIFFERENTIAL DIAGNOSIS: After history and physical exam differential diagnosis includes but is not limited to bronchopneumonia, fluid overload, C Medication List as of 5/3/2021  6:50 AM    START taking these medications    benzonatate 100 MG Oral Cap  Take 1 capsule (100 mg total) by mouth 3 (three) times daily as needed for cough. , Print, Disp-30 capsule, R-0    benzocaine-menthol ([address]

## 2021-05-03 NOTE — ED INITIAL ASSESSMENT (HPI)
Patient states having cough x2 weeks, more at night. Pt states being placed by his doctor on Amoxicillin . Patient took already meds for 5 days. Patient denies shortness or breath, fevers or new meds. Denies COVID contacts.  Patient states being vaccinates

## 2021-06-05 ENCOUNTER — APPOINTMENT (OUTPATIENT)
Dept: GENERAL RADIOLOGY | Facility: HOSPITAL | Age: 61
End: 2021-06-05
Attending: EMERGENCY MEDICINE
Payer: MEDICARE

## 2021-06-05 ENCOUNTER — HOSPITAL ENCOUNTER (EMERGENCY)
Facility: HOSPITAL | Age: 61
Discharge: HOME | End: 2021-06-05
Attending: EMERGENCY MEDICINE
Payer: MEDICARE

## 2021-06-05 VITALS
HEIGHT: 74 IN | WEIGHT: 272 LBS | HEART RATE: 84 BPM | OXYGEN SATURATION: 96 % | RESPIRATION RATE: 18 BRPM | SYSTOLIC BLOOD PRESSURE: 122 MMHG | BODY MASS INDEX: 34.91 KG/M2 | DIASTOLIC BLOOD PRESSURE: 70 MMHG | TEMPERATURE: 97 F

## 2021-06-05 DIAGNOSIS — M62.830 BACK SPASM: Primary | ICD-10-CM

## 2021-06-05 PROCEDURE — 96374 THER/PROPH/DIAG INJ IV PUSH: CPT

## 2021-06-05 PROCEDURE — 85379 FIBRIN DEGRADATION QUANT: CPT | Performed by: EMERGENCY MEDICINE

## 2021-06-05 PROCEDURE — 80048 BASIC METABOLIC PNL TOTAL CA: CPT | Performed by: EMERGENCY MEDICINE

## 2021-06-05 PROCEDURE — 83880 ASSAY OF NATRIURETIC PEPTIDE: CPT | Performed by: EMERGENCY MEDICINE

## 2021-06-05 PROCEDURE — 99284 EMERGENCY DEPT VISIT MOD MDM: CPT

## 2021-06-05 PROCEDURE — 84484 ASSAY OF TROPONIN QUANT: CPT | Performed by: EMERGENCY MEDICINE

## 2021-06-05 PROCEDURE — 85025 COMPLETE CBC W/AUTO DIFF WBC: CPT | Performed by: EMERGENCY MEDICINE

## 2021-06-05 PROCEDURE — 71045 X-RAY EXAM CHEST 1 VIEW: CPT | Performed by: EMERGENCY MEDICINE

## 2021-06-05 RX ORDER — ORPHENADRINE CITRATE 100 MG/1
100 TABLET, EXTENDED RELEASE ORAL 2 TIMES DAILY
Qty: 20 TABLET | Refills: 0 | Status: SHIPPED | OUTPATIENT
Start: 2021-06-05 | End: 2021-06-15

## 2021-06-05 RX ORDER — ORPHENADRINE CITRATE 30 MG/ML
30 INJECTION INTRAMUSCULAR; INTRAVENOUS ONCE
Status: COMPLETED | OUTPATIENT
Start: 2021-06-05 | End: 2021-06-05

## 2021-06-05 RX ORDER — LIDOCAINE 50 MG/G
1 PATCH TOPICAL EVERY 24 HOURS
Qty: 7 PATCH | Refills: 0 | Status: SHIPPED | OUTPATIENT
Start: 2021-06-05 | End: 2021-06-12

## 2021-06-05 NOTE — ED INITIAL ASSESSMENT (HPI)
Left flank pain that started yesterday. Hx blood clots in lungs, takes blood thinner. Pain feels the same.  Deep breathing makes pain worse

## 2021-06-05 NOTE — ED PROVIDER NOTES
Patient Seen in: [facility] AND [facility] Emergency Department    History   Patient presents with:  Back Pain    Stated Complaint: left flank pain     HPI    53-year-old male with past medical history of CAD, CHF (20% EF), DM, HTN, HL, ESRD (TTS via LUE AVF), edna Tab,  Take 100 mg by mouth daily. apixaban 2.5 MG Oral Tab,  Take 5 mg by mouth 2 (two) times daily. aspirin 81 MG Oral Tab EC,  Take 81 mg by mouth daily. Cinacalcet HCl 60 MG Oral Tab,  Take 60 mg by mouth every evening.      furosemide 20 MG Oral T well-appearing. HEENT: MMM. Head: Normocephalic. Eyes: No injection. Cardiovascular: RRR. BUE with intact/equal radial pulses. Pulmonary/Chest: Effort normal. CTAB. Abdominal: Soft. Nontender. Musculoskeletal: No gross deformity.  No midline thorac Salvador Gold OF EXAM: 06/05/2021  Patient No:  [id number]  Physician:  Bárbara Dupree  YOB: 1960    Past Medical History (entered by Technologist):  History of hypertension, CHF and lung blood clots  Reason For Exam (e details.     Disposition and Plan     Clinical Impression:  Back spasm  (primary encounter diagnosis)    Disposition:  Discharge    Follow-up:  Rojas Torres  [address] [facility] [place]  [phone]    Call  For followup and re-ev

## 2023-05-18 NOTE — PLAN OF CARE
Coincides umbilical hernia appreciated on examination.  Easily reducible and no signs of incarceration or strangulation.  At this time does not require immediate surgical intervention.  Provided surgical consult for patient as he wishes to establish and potentially have surgery this winter.    Reviewed ED precautions for strangulation/incarceration of hernia.     Problem: ALTERED NUTRIENT INTAKE - ADULT  Goal: Nutrient intake appropriate for improving, restoring or maintaining nutritional needs  Description  INTERVENTIONS:  - Assess nutritional status and recommend course of action  - Monitor oral intake, labs, a

## 2024-04-22 ENCOUNTER — HOSPITAL ENCOUNTER (EMERGENCY)
Facility: HOSPITAL | Age: 64
Discharge: HOME | End: 2024-04-22
Attending: STUDENT IN AN ORGANIZED HEALTH CARE EDUCATION/TRAINING PROGRAM
Payer: COMMERCIAL

## 2024-04-22 ENCOUNTER — APPOINTMENT (OUTPATIENT)
Dept: CT IMAGING | Facility: HOSPITAL | Age: 64
End: 2024-04-22
Attending: STUDENT IN AN ORGANIZED HEALTH CARE EDUCATION/TRAINING PROGRAM
Payer: COMMERCIAL

## 2024-04-22 ENCOUNTER — APPOINTMENT (OUTPATIENT)
Dept: GENERAL RADIOLOGY | Facility: HOSPITAL | Age: 64
End: 2024-04-22
Attending: STUDENT IN AN ORGANIZED HEALTH CARE EDUCATION/TRAINING PROGRAM
Payer: COMMERCIAL

## 2024-04-22 VITALS
SYSTOLIC BLOOD PRESSURE: 158 MMHG | OXYGEN SATURATION: 98 % | HEART RATE: 71 BPM | RESPIRATION RATE: 18 BRPM | TEMPERATURE: 98 F | DIASTOLIC BLOOD PRESSURE: 95 MMHG

## 2024-04-22 DIAGNOSIS — R07.9 CHEST PAIN OF UNCERTAIN ETIOLOGY: Primary | ICD-10-CM

## 2024-04-22 DIAGNOSIS — E87.5 HYPERKALEMIA: ICD-10-CM

## 2024-04-22 LAB
ALBUMIN SERPL-MCNC: 4.2 G/DL (ref 3.2–4.8)
ALP LIVER SERPL-CCNC: 82 U/L
ALT SERPL-CCNC: 13 U/L
ANION GAP SERPL CALC-SCNC: 8 MMOL/L (ref 0–18)
AST SERPL-CCNC: 26 U/L (ref ?–34)
ATRIAL RATE: 75 BPM
BASOPHILS # BLD AUTO: 0.03 X10(3) UL (ref 0–0.2)
BASOPHILS NFR BLD AUTO: 0.5 %
BILIRUB DIRECT SERPL-MCNC: 0.1 MG/DL (ref ?–0.3)
BILIRUB SERPL-MCNC: 0.4 MG/DL (ref 0.2–1.1)
BUN BLD-MCNC: 31 MG/DL (ref 9–23)
BUN/CREAT SERPL: 18.5 (ref 10–20)
CALCIUM BLD-MCNC: 10.9 MG/DL (ref 8.7–10.4)
CHLORIDE SERPL-SCNC: 111 MMOL/L (ref 98–112)
CO2 SERPL-SCNC: 18 MMOL/L (ref 21–32)
CREAT BLD-MCNC: 1.68 MG/DL
DEPRECATED RDW RBC AUTO: 48.3 FL (ref 35.1–46.3)
EGFRCR SERPLBLD CKD-EPI 2021: 46 ML/MIN/1.73M2 (ref 60–?)
EOSINOPHIL # BLD AUTO: 0.05 X10(3) UL (ref 0–0.7)
EOSINOPHIL NFR BLD AUTO: 0.8 %
ERYTHROCYTE [DISTWIDTH] IN BLOOD BY AUTOMATED COUNT: 15.2 % (ref 11–15)
GLUCOSE BLD-MCNC: 120 MG/DL (ref 70–99)
HCT VFR BLD AUTO: 40 %
HGB BLD-MCNC: 13.7 G/DL
IMM GRANULOCYTES # BLD AUTO: 0.03 X10(3) UL (ref 0–1)
IMM GRANULOCYTES NFR BLD: 0.5 %
LIPASE SERPL-CCNC: 46 U/L (ref 13–75)
LYMPHOCYTES # BLD AUTO: 0.64 X10(3) UL (ref 1–4)
LYMPHOCYTES NFR BLD AUTO: 10.3 %
MCH RBC QN AUTO: 30.2 PG (ref 26–34)
MCHC RBC AUTO-ENTMCNC: 34.3 G/DL (ref 31–37)
MCV RBC AUTO: 88.1 FL
MONOCYTES # BLD AUTO: 0.69 X10(3) UL (ref 0.1–1)
MONOCYTES NFR BLD AUTO: 11.1 %
NEUTROPHILS # BLD AUTO: 4.76 X10 (3) UL (ref 1.5–7.7)
NEUTROPHILS # BLD AUTO: 4.76 X10(3) UL (ref 1.5–7.7)
NEUTROPHILS NFR BLD AUTO: 76.8 %
OSMOLALITY SERPL CALC.SUM OF ELEC: 292 MOSM/KG (ref 275–295)
P AXIS: 28 DEGREES
P-R INTERVAL: 170 MS
PLATELET # BLD AUTO: 87 10(3)UL (ref 150–450)
PLATELETS.RETICULATED NFR BLD AUTO: 13.1 % (ref 0–7)
POTASSIUM SERPL-SCNC: 5.3 MMOL/L (ref 3.5–5.1)
PROT SERPL-MCNC: 7.6 G/DL (ref 5.7–8.2)
Q-T INTERVAL: 380 MS
QRS DURATION: 100 MS
QTC CALCULATION (BEZET): 424 MS
R AXIS: -22 DEGREES
RBC # BLD AUTO: 4.54 X10(6)UL
SODIUM SERPL-SCNC: 137 MMOL/L (ref 136–145)
T AXIS: 49 DEGREES
TROPONIN I SERPL HS-MCNC: 15 NG/L
TROPONIN I SERPL HS-MCNC: 15 NG/L
VENTRICULAR RATE: 75 BPM
WBC # BLD AUTO: 6.2 X10(3) UL (ref 4–11)

## 2024-04-22 PROCEDURE — 84484 ASSAY OF TROPONIN QUANT: CPT | Performed by: STUDENT IN AN ORGANIZED HEALTH CARE EDUCATION/TRAINING PROGRAM

## 2024-04-22 PROCEDURE — 96360 HYDRATION IV INFUSION INIT: CPT

## 2024-04-22 PROCEDURE — 74175 CTA ABDOMEN W/CONTRAST: CPT | Performed by: STUDENT IN AN ORGANIZED HEALTH CARE EDUCATION/TRAINING PROGRAM

## 2024-04-22 PROCEDURE — 96361 HYDRATE IV INFUSION ADD-ON: CPT

## 2024-04-22 PROCEDURE — 93010 ELECTROCARDIOGRAM REPORT: CPT

## 2024-04-22 PROCEDURE — 99285 EMERGENCY DEPT VISIT HI MDM: CPT

## 2024-04-22 PROCEDURE — 80076 HEPATIC FUNCTION PANEL: CPT | Performed by: STUDENT IN AN ORGANIZED HEALTH CARE EDUCATION/TRAINING PROGRAM

## 2024-04-22 PROCEDURE — 71275 CT ANGIOGRAPHY CHEST: CPT | Performed by: STUDENT IN AN ORGANIZED HEALTH CARE EDUCATION/TRAINING PROGRAM

## 2024-04-22 PROCEDURE — 85025 COMPLETE CBC W/AUTO DIFF WBC: CPT | Performed by: STUDENT IN AN ORGANIZED HEALTH CARE EDUCATION/TRAINING PROGRAM

## 2024-04-22 PROCEDURE — 83690 ASSAY OF LIPASE: CPT | Performed by: STUDENT IN AN ORGANIZED HEALTH CARE EDUCATION/TRAINING PROGRAM

## 2024-04-22 PROCEDURE — 80048 BASIC METABOLIC PNL TOTAL CA: CPT | Performed by: STUDENT IN AN ORGANIZED HEALTH CARE EDUCATION/TRAINING PROGRAM

## 2024-04-22 PROCEDURE — 93005 ELECTROCARDIOGRAM TRACING: CPT

## 2024-04-22 PROCEDURE — 71045 X-RAY EXAM CHEST 1 VIEW: CPT | Performed by: STUDENT IN AN ORGANIZED HEALTH CARE EDUCATION/TRAINING PROGRAM

## 2024-04-22 RX ORDER — ACETAMINOPHEN 500 MG
1000 TABLET ORAL ONCE
Status: COMPLETED | OUTPATIENT
Start: 2024-04-22 | End: 2024-04-22

## 2024-04-22 RX ORDER — SODIUM CHLORIDE 9 MG/ML
INJECTION, SOLUTION INTRAVENOUS ONCE
Status: COMPLETED | OUTPATIENT
Start: 2024-04-22 | End: 2024-04-22

## 2024-04-22 RX ORDER — LIDOCAINE 50 MG/G
1 PATCH TOPICAL EVERY 24 HOURS
Qty: 7 PATCH | Refills: 0 | Status: SHIPPED | OUTPATIENT
Start: 2024-04-22 | End: 2024-04-29

## 2024-04-22 NOTE — ED QUICK NOTES
Pt made aware of elevated BP. Pt stated he did not take his BP meds this morning and is compliant with his medications.  Pt stated will resume when home.

## 2024-04-22 NOTE — ED INITIAL ASSESSMENT (HPI)
Pt to ED for chest pressure radiating to back for the past 4 days. Pt also has sob, denies dizziness or nausea.

## 2024-04-22 NOTE — DISCHARGE INSTRUCTIONS
Please return to the emergency department if you develop severe and persistent chest pain,  difficulty breathing, dizziness, leg swelling or if you are coughing up blood as these can be signs  of a medical emergency. Please call your doctor for a follow up appointment in 2-3 days to  determine the need for further testing.    As we discussed your potassium was mildly elevated today, we gave you IV fluids please follow-up with your kidney doctor to repeat your potassium testing

## 2024-04-22 NOTE — ED PROVIDER NOTES
Patient Seen in: [facility] Emergency Department      History     Chief Complaint   Patient presents with    Chest Pain Angina     Stated Complaint: chest pressure    Subjective:   HPI    64-year-old male history of IDDM hypertension hyperlipidemia ESRD status post DDRT 2022, PE on Eliquis, presenting for evaluation of chest pain.  He states onset of symptoms 4 days ago while fishing.  He describes a constant pressure across his chest as well as in his back between his shoulder blades.  Pain is persistent.  Pain worsened over the past day which prompted presentation to the ER today.  He denies any relieving factors.  Pain is sometimes worse with cough and deep breathing.  He denies any exertional component to the symptoms.  No shortness of breath diaphoresis jaw pain arm pain nausea.  Denies ripping or tearing component.  Rejection meds: Envarsus, Myfortic, Prednisone  Cardiac Cath 8/6/2020: 30% proximal RCA lesion     Objective:   No pertinent past medical history.            Past Surgical History:   Procedure Laterality Date    Other      LUE fistula                Social History     Socioeconomic History    Marital status:    Tobacco Use    Smoking status: Every Day     Current packs/day: 1.00     Average packs/day: 1 pack/day for 18.0 years (18.0 ttl pk-yrs)     Types: Cigars, Cigarettes    Smokeless tobacco: Never    Tobacco comments:     smokes approx 4 cigars per week   Vaping Use    Vaping status: Never Used   Substance and Sexual Activity    Alcohol use: Not Currently    Drug use: Never     Social Determinants of Health      Received from [facility]    Social Connections    Received from [facility]    Housing Stability              Review of Systems    Positive for stated complaint: chest pressure  Other systems are as noted in HPI.  Constitutional and vital signs reviewed.      All other systems reviewed and negative except as noted above.    Physical  Exam     ED Triage Vitals [04/22/24 0548]   /79   Pulse 77   Resp 18   Temp 98.1 °F (36.7 °C)   Temp src Temporal   SpO2 98 %   O2 Device None (Room air)       Current:BP (!) 158/95   Pulse 71   Temp 98.1 °F (36.7 °C) (Temporal)   Resp 18   SpO2 98%         Physical Exam    Constitutional: awake, alert, no sig distress  HENT: mmm, no lesions,  Neck: normal range of motion, no tenderness, supple.  Eyes: PERRL, EOMI, conjunctiva normal, no discharge. Sclera anicteric.  Cardiovascular: rr no murmur  Respiratory: Normal breath sounds, no respiratory distress, no wheezing, no chest tenderness.  GI: Bowel sounds normal, Soft, no tenderness, no masses, no pulsatile masses.  : No CVA tenderness.  Skin: Warm, dry, no erythema, no rash.  Musculoskeletal: Intact distal pulses, no edema, no tenderness, no cyanosis, no clubbing. Good range of motion in all major joints. No tenderness to palpation or major deformities noted. Back- No tenderness.  Neurologic: Alert & oriented x 3, normal motor function, normal sensory function, no focal deficits noted.  Psych: Calm, cooperative, nl affect        ED Course     Labs Reviewed   BASIC METABOLIC PANEL (8) - Abnormal; Notable for the following components:       Result Value    Glucose 120 (*)     Potassium 5.3 (*)     CO2 18.0 (*)     BUN 31 (*)     Creatinine 1.68 (*)     Calcium, Total 10.9 (*)     eGFR-Cr 46 (*)     All other components within normal limits   CBC W/ DIFFERENTIAL - Abnormal; Notable for the following components:    RDW-SD 48.3 (*)     RDW 15.2 (*)     PLT 87.0 (*)     Immature Platelet Fraction 13.1 (*)     Lymphocyte Absolute 0.64 (*)     All other components within normal limits   HEPATIC FUNCTION PANEL (7) - Normal   TROPONIN I HIGH SENSITIVITY - Normal   LIPASE - Normal   TROPONIN I HIGH SENSITIVITY - Normal   CBC WITH DIFFERENTIAL WITH PLATELET    Narrative:     The following orders were created for panel order CBC With Differential With  Platelet.  Procedure                               Abnormality         Status                     ---------                               -----------         ------                     CBC W/ DIFFERENTIAL[[id number]]          Abnormal            Final result                 Please view results for these tests on the individual orders.                   ED Course as of 04/22/24 1329  ------------------------------------------------------------  Time: 04/22 0625  Comment: EKG as interpreted by ED physician: NSR 75 BPM, left axis, normal intervals, nonspecific t wave changes. No stemi. Qtc 424ms  ------------------------------------------------------------  Time: 04/22 0743  Comment: I have independently reviewed patient's chest x-ray do not appreciate any acute cardiopulmonary findings.  ------------------------------------------------------------  Time: 04/22 0823  Comment: D/w nephrology who recommends gentle IV hydration 50cc/hr until time of disposition. If discharged will get follow up renal function testing in ~3d  ------------------------------------------------------------  Time: 04/22 0858  Comment: I had shared decision-making conversation with the patient, we weighed the risks of undiagnosed aortic dissection versus the risks of potential contrast-induced nephropathy from contrast media administration in a renal transplant patient.  I also discussed with nephrology, recommended gentle IV hydration.  All questions were answered to the best my ability.  Ultimately patient was agreeable to IV contrast administration.  I feel this is reasonable as given chest pain with radiation to back and several cardiovascular risk factors and no other expedient alternative diagnostic modalities to rule out dissection available in the emergency dept.   ------------------------------------------------------------  Time: 04/22 0927  Comment: Troponins x2 negative              MDM      64-year-old male with history as documented  above presenting with chest pain radiating to back 4 days duration.  On arrival hypertensive otherwise vitals are stable and reassuring.  DDx: ACS, acute aortic syndrome, MSK pain, pleurisy, pericardial effusion  Bedside echocardiogram shows reduced ejection fraction no hemodynamically significant pericardial effusion  Plan labs troponin x 2 chest x-ray, will discuss with nephrology regarding contrast administration for potential CTA to rule out dissection given radiation to back     Labs returned with negative troponin otherwise are reassuring.  He did have mild metabolic acidosis and hyperkalemia which was addressed with gentle IV fluids owing to patient's history of HFrEF.  Serum creatinine and bicarb are similar to last measured chemistries in September 2023.      Return precautions and follow-up instructions were discussed with patient who voiced understanding and agreement the plan.  All questions were answered to patient satisfaction.                         Medical Decision Making      Disposition and Plan     Clinical Impression:  1. Chest pain of uncertain etiology    2. Hyperkalemia         Disposition:  Discharge  4/22/2024 10:05 am    Follow-up:  Emmanuel Odell  [address] [facility]  [address]  [place] [address]  [phone]    Follow up in 2 day(s)      [facility] Emergency Department  [address] [address]  [phone]  Follow up  If symptoms worsen, As needed    Marty Harper  [address]  [address]  [place] [id number] [phone]    Call today      We recommend that you schedule follow up care with a primary care provider within the next three months to obtain basic health screening including reassessment of your blood pressure.      Medications Prescribed:  Discharge Medication List as of 4/22/2024 10:36 AM        START taking these medications    Details   lidocaine 5 % External Patch Place 1 patch onto the skin daily for 7 days., Normal, Disp-7  patch, R-0

## 2024-04-22 NOTE — ED QUICK NOTES
Rounding Completed    PT sitting comfortably in bed. Family at bedside. BP cuff placed back on pt.    Bed is locked and in lowest position. Call light within reach.

## (undated) NOTE — LETTER
[address] [facility]     I agree to have a Peripherally Inserted Central Catheter (PICC) placed in my arm.    1. The PICC insertion procedure, care, maintenance, risks, benefits, and complications have been explained to me b also discussed reasonable alternatives to the PICC, including risks, benefits, and side effects related to the alternatives and risks related to not receiving this procedure.     8.  I have expressed any questions about this procedure to my physician or the

## (undated) NOTE — LETTER
[address] [facility]     I agree to have a Peripherally Inserted Central Catheter (PICC) placed in my arm.    1. The PICC insertion procedure, care, maintenance, risks, benefits, and complications have been explained to me b also discussed reasonable alternatives to the PICC, including risks, benefits, and side effects related to the alternatives and risks related to not receiving this procedure.     8.  I have expressed any questions about this procedure to my physician or the

## (undated) NOTE — ED AVS SNAPSHOT
Lety Rust   MRN: [id number]    Department:  [facility] Emergency Department   Date of Visit:  4/14/2019           Disclosure     Insurance plans vary and the physician(s) referred by the ER may not be covered by your plan.  Please contac within the next three months to obtain basic health screening including reassessment of your blood pressure.     IF THERE IS ANY CHANGE OR WORSENING OF YOUR CONDITION, CALL YOUR PRIMARY CARE PHYSICIAN AT ONCE OR RETURN IMMEDIATELY TO THE EMERGENCY DEPARTMEN

## (undated) NOTE — ED AVS SNAPSHOT
Lennox Book   MRN: [id number]    Department:  [facility] Emergency Department   Date of Visit:  11/20/2019           Disclosure     Insurance plans vary and the physician(s) referred by the ER may not be covered by your plan.  Please conta within the next three months to obtain basic health screening including reassessment of your blood pressure.     IF THERE IS ANY CHANGE OR WORSENING OF YOUR CONDITION, CALL YOUR PRIMARY CARE PHYSICIAN AT ONCE OR RETURN IMMEDIATELY TO THE EMERGENCY DEPARTMEN

## (undated) NOTE — ED AVS SNAPSHOT
Daja Banegas   MRN: [id number]    Department:  [facility] Emergency Department   Date of Visit:  2/15/2018           Disclosure     Insurance plans vary and the physician(s) referred by the ER may not be covered by your plan.  Please contac within the next three months to obtain basic health screening including reassessment of your blood pressure.     IF THERE IS ANY CHANGE OR WORSENING OF YOUR CONDITION, CALL YOUR PRIMARY CARE PHYSICIAN AT ONCE OR RETURN IMMEDIATELY TO THE EMERGENCY DEPARTMEN